# Patient Record
Sex: FEMALE | Race: WHITE | NOT HISPANIC OR LATINO | Employment: FULL TIME | URBAN - METROPOLITAN AREA
[De-identification: names, ages, dates, MRNs, and addresses within clinical notes are randomized per-mention and may not be internally consistent; named-entity substitution may affect disease eponyms.]

---

## 2023-08-10 ENCOUNTER — HOSPITAL ENCOUNTER (OUTPATIENT)
Dept: DATA CONVERSION | Facility: HOSPITAL | Age: 38
Discharge: HOME | End: 2023-08-10

## 2023-08-10 DIAGNOSIS — M75.101 UNSPECIFIED ROTATOR CUFF TEAR OR RUPTURE OF RIGHT SHOULDER, NOT SPECIFIED AS TRAUMATIC: ICD-10-CM

## 2023-08-17 LAB
10OH-CARBAZEPINE/CREAT UR CFM: NOT DETECTED NG/MG{CREAT}
6MAM UR QL CFM: NEGATIVE
6MAM/CREAT UR: NORMAL
7AMINOCLONAZEPAM/CREAT UR: NORMAL
8OH-AMOXAPINE UR QL: NOT DETECTED
8OH-LOXAPINE/CREAT UR CFM: NOT DETECTED NG/MG{CREAT}
A-OH ALPRAZ/CREAT UR: NORMAL
A-OH-TRIAZOLAM/CREAT UR CFM: NORMAL NG/MG{CREAT}
ALFENTANIL/CREAT UR CFM: NORMAL NG/MG{CREAT}
ALPHA-HYDROXYMIDAZOLAM: NORMAL
ALPRAZ/CREAT UR CFM: NORMAL NG/MG{CREAT}
AMINO CHLOROPYRIDINE: NOT DETECTED
AMITRIP UR QL CFM: NOT DETECTED
AMOBARBITAL UR QL CFM: NOT DETECTED
AMOXAPINE UR QL: NOT DETECTED
AMPHET/CREAT UR: NORMAL
AMPHETAMINES UR QL CFM: NEGATIVE
ANALGESICS NON-NARCOTIC UR QL: NORMAL
ANTICONVULSANTS UR: NEGATIVE
ANTIDEPRESSANTS UR QL: NEGATIVE
ANTIHISTAMINES UR QL CFM: NEGATIVE
ANTIPSYCHOTICS UR QL SCN: NEGATIVE
APAP UR QL: PRESENT
ARIPIPRAZOLE/CREAT UR CFM: NOT DETECTED NG/MG{CREAT}
ASENAPINE: NOT DETECTED
ATENOLOL UR QL: NOT DETECTED
ATOMOXETINE/CREAT UR CFM: NOT DETECTED NG/MG{CREAT}
BACLOFEN/CREAT UR CFM: NOT DETECTED NG/MG{CREAT}
BARBITAL UR QL CFM: NOT DETECTED
BARBITURATES UR QL CFM: NEGATIVE
BENZODIAZ UR QL CFM: NEGATIVE
BENZTROPINE UR QL: NOT DETECTED
BROMPHENIRAMINE UR QL: NOT DETECTED
BUPRENORPHINE UR QL CFM: NEGATIVE
BUPRENORPHINE/CREAT UR: NORMAL
BUPROPION UR QL: NOT DETECTED
BUTABARBITAL UR QL CFM: NOT DETECTED
BUTALBITAL UR QL CFM: NOT DETECTED
BUTORPHANOL UR QL CFM: NOT DETECTED
BZE/CREAT UR: NORMAL
CAFFEINE UR QL: NOT DETECTED
CANNABINOIDS UR QL CFM: NEGATIVE
CARBAMAZEPINE UR QL: NOT DETECTED
CARBOXYTHC/CREAT UR: NORMAL
CARISOPRODOL UR QL CFM: NOT DETECTED
CHLORPHENIR UR QL CFM: NOT DETECTED
CHLORPROMAZINE UR QL CFM: NOT DETECTED
CITALOPRAM UR QL: NOT DETECTED
CLOBAZAM UR QL: NOT DETECTED
CLOMIPRAMINE UR QL: NOT DETECTED
CLONAZEPAM/CREAT UR CFM: NORMAL NG/MG{CREAT}
CLONIDINE UR QL: NOT DETECTED
CLOZAPINE UR QL: NOT DETECTED
COCAETHYLENE/CREAT UR CFM: NORMAL NG/MG{CREAT}
COCAINE UR QL CFM: NEGATIVE
COCAINE/CREAT UR CFM: NORMAL NG/MG{CREAT}
CODEINE/CREAT UR: NORMAL
CREAT UR-MCNC: NORMAL MG/DL
CYCLOBENZAPRINE UR QL CFM: NOT DETECTED
D-METHORPHAN UR QL: NOT DETECTED
D-METHORPHAN+LEVORPHANOL UR QL: NORMAL
DESALKYLFLURAZ/CREAT UR: NORMAL NG/MG{CREAT}
DESIPRAMINE UR QL CFM: NOT DETECTED
DESMETHYLCYCLOBENZAPRINE: NOT DETECTED
DESMETHYLFLUNITRAZEPAM: NORMAL
DHC/CREAT UR: NORMAL NG/MG{CREAT}
DIAZEPAM/CREAT UR: NORMAL NG/MG{CREAT}
DICLOFENAC/CREAT UR CFM: NOT DETECTED NG/MG{CREAT}
DIETHYLPROPION UR QL CFM: NOT DETECTED
DILTIAZEM UR QL: NOT DETECTED
DIPHENHY UR QL: NOT DETECTED
DOXEPIN UR QL CFM: NOT DETECTED
DOXYLAMINE UR QL: NOT DETECTED
DRUGS UR CFM: NEGATIVE
DRUGS UR: NORMAL
DULOXETINE UR QL CFM: NOT DETECTED
EDDP/CREAT UR: NORMAL
EPHEDRIN+PSEUDO UR QL: NOT DETECTED
ETHANOL UR CFM-MCNC: NORMAL MG/DL
ETHANOL UR QL CFM: NEGATIVE
EZOGABINE/CREAT UR CFM: NOT DETECTED NG/MG{CREAT}
FENTANYL UR QL CFM: NEGATIVE
FENTANYL/CREAT UR: NORMAL
FLUNITRAZEPAM UR QL CFM: NORMAL
FLUOXETINE UR QL CFM: NOT DETECTED
FLUPHENAZINE UR QL: NOT DETECTED
FLUVOXAMINE UR QL: NOT DETECTED
GABAPENTIN UR QL: NOT DETECTED
GUAIFENESIN/CREAT UR CFM: NOT DETECTED NG/MG{CREAT}
HALLUCINOGENS UR: NEGATIVE
HALOPERIDOL UR QL CFM: NOT DETECTED
HYDROCODONE/CREAT UR: NORMAL
HYDROMORPHONE/CREAT UR: NORMAL
HYDROXYZINE UR QL: NOT DETECTED
HYPNOTICS UR QL SCN: NEGATIVE
IBUPROFEN UR QL: PRESENT
ILOPERIDONE: NOT DETECTED
IMIPRAMINE UR QL CFM: NOT DETECTED
KETAMINE UR QL CFM: NOT DETECTED
KETOPROFEN/CREAT UR CFM: NOT DETECTED NG/MG{CREAT}
LAMOTRIGINE/CREAT UR CFM: NOT DETECTED NG/MG{CREAT}
LEVEL OF DETECTION: (TOX25): NORMAL
LEVETIRACETAM/CREAT UR CFM: NOT DETECTED NG/MG{CREAT}
LIDOCAIN UR QL: NOT DETECTED
LIDOCAIN UR QL: NOT DETECTED
LOCAL ANESTHETICS: NEGATIVE
LORAZEPAM/CREAT UR: NORMAL
LOXAPINE UR QL: NOT DETECTED
LURASIDONE UR CFM-MCNC: NOT DETECTED NG/ML
M-CPP UR QL: NOT DETECTED
MAPROTILINE UR QL: NOT DETECTED
MDA/CREAT UR: NORMAL NG/MG{CREAT}
MDEA UR QL: NOT DETECTED
MDMA/CREAT UR: NORMAL NG/MG{CREAT}
ME-PHENIDATE UR QL CFM: NOT DETECTED
MEPERIDINE UR QL CFM: NOT DETECTED
MEPHOBARBITAL UR QL CFM: NOT DETECTED
MEPIVACAINE UR QL: NOT DETECTED
MEPROBAMATE UR QL CFM: NOT DETECTED
MESORIDAZINE UR QL CFM: NOT DETECTED
METAXALONE/CREAT UR CFM: NOT DETECTED NG/MG{CREAT}
METHADONE UR QL CFM: NEGATIVE
METHADONE/CREAT UR: NORMAL
METHAMPHET/CREAT UR: NORMAL
METHCATHINONE/CREAT UR CFM: NOT DETECTED NG/MG{CREAT}
METHOCARBAMOL UR QL: NOT DETECTED
METOPROLOL UR QL: NOT DETECTED
MIDAZOLAM/CREAT UR CFM: NORMAL NG/MG{CREAT}
MILNACIPRAN/CREAT UR CFM: NOT DETECTED NG/MG{CREAT}
MIRTAZAPINE UR QL CFM: NOT DETECTED
MOLINDONE/CREAT UR CFM: NOT DETECTED NG/MG{CREAT}
MORPHINE/CREAT UR: NORMAL
MUSCLE RELAXANTS: NEGATIVE
N-NORTRAMADOL/CREAT UR CFM: NORMAL NG/MG{CREAT}
NALBUPHINE UR QL CFM: NOT DETECTED
NALTREXONE UR QL CFM: NOT DETECTED
NAPROXEN/CREAT UR CFM: PRESENT NG/MG{CREAT}
NARCOTICS UR: NEGATIVE
NEFAZODONE UR QL: NOT DETECTED
NORBUPRENORPHINE/CREAT UR: NORMAL
NORCITALOPRAM UR QL: NOT DETECTED
NORCLOMIPRAMINE UR QL: NOT DETECTED
NORCLOZAPINE UR QL: NOT DETECTED
NORCODEINE/CREAT UR CFM: NORMAL NG/MG{CREAT}
NORDIAZEPAM/CREAT UR: NORMAL
NORDOXEPIN UR QL: NOT DETECTED
NORFENTANYL/CREAT UR: NORMAL
NORFLUOXETINE UR QL CFM: NOT DETECTED
NORHYDROCODONE/CREAT UR: NORMAL
NORKETAMINE UR CFM-MCNC: NOT DETECTED NG/ML
NORMEPERIDINE UR QL CFM: NOT DETECTED
NORMORPHINE UR-MCNC: NORMAL NG/ML
NOROXYCODONE/CREAT UR: NORMAL
NOROXYMORPHONE/CREAT UR CFM: NORMAL NG/MG{CREAT}
NORPROPOXYPH UR QL CFM: NOT DETECTED
NORSERTRALINE UR QL: NOT DETECTED
NORTRIP UR QL CFM: NOT DETECTED
O-NORTRAMADOL UR CFM-MCNC: NORMAL NG/ML
ODV UR QL: NOT DETECTED
OH-BUPROPION UR QL CFM: NOT DETECTED
OLANZAPINE UR QL: NOT DETECTED
OPIATES UR QL CFM: NORMAL
ORPHENADRINE UR QL: NOT DETECTED
OXAPROZIN/CREAT UR CFM: NOT DETECTED NG/MG{CREAT}
OXAZEPAM/CREAT UR: NORMAL
OXYCODONE UR QL CFM: NEGATIVE
OXYCODONE/CREAT UR: NORMAL
OXYMORPHONE/CREAT UR: NORMAL
PAROXETINE UR QL: NOT DETECTED
PCP UR QL CFM: NOT DETECTED
PENTAZOCINE UR QL CFM: NOT DETECTED
PENTOBARB UR QL CFM: NOT DETECTED
PERPHENAZINE UR QL: NOT DETECTED
PHENMETRAZINE UR QL CFM: NOT DETECTED
PHENOBARB UR QL CFM: NOT DETECTED
PHENTERMINE UR QL CFM: NOT DETECTED
PHENYTOIN UR QL: NOT DETECTED
PIMOZIDE/CREAT UR CFM: NOT DETECTED NG/MG{CREAT}
PPA UR QL: NOT DETECTED
PPAA UR QL: NOT DETECTED
PREGABALIN UR QL CFM: NOT DETECTED
PRIMIDONE/CREAT UR CFM: NOT DETECTED NG/MG{CREAT}
PROCAINE UR QL: NOT DETECTED
PROCHLORPERAZINE UR QL: NOT DETECTED
PROMETHAZINE UR QL: NOT DETECTED
PROPOXYPH UR QL CFM: NOT DETECTED
PROPRANOLOL UR QL: NOT DETECTED
PROTRIP UR QL: NOT DETECTED
PYRILAMINE UR QL: NOT DETECTED
QUETIAPINE UR QL: NOT DETECTED
RISPERIDONE UR QL: NOT DETECTED
RUFINAMIDE/CREAT UR CFM: NOT DETECTED NG/MG{CREAT}
SALICYLATES UR QL: NOT DETECTED
SECOBARBITAL UR QL CFM: NOT DETECTED
SERTRALINE UR QL: NOT DETECTED
SUFENTANIL/CREAT UR CFM: NORMAL NG/MG{CREAT}
SYMPATHOMIMETICS UR QL CFM: NEGATIVE
TAPENTADOL UR QL CFM: NEGATIVE
TAPENTADOL/CREAT UR: NORMAL
TEMAZEPAM/CREAT UR: NORMAL
THEOPHYLLINE/CREAT UR CFM: NOT DETECTED NG/MG{CREAT}
THIOPENTAL UR QL CFM: NOT DETECTED
THIORIDAZINE UR QL CFM: NOT DETECTED
THIOTHIXENE UR QL: NOT DETECTED
TIAGABINE/CREAT UR CFM: NOT DETECTED NG/MG{CREAT}
TIZANIDINE/CREAT UR CFM: NOT DETECTED NG/MG{CREAT}
TOPIRAMATE/CREAT UR CFM: NOT DETECTED NG/MG{CREAT}
TRAMADOL UR QL CFM: NORMAL
TRAZODONE UR QL: NOT DETECTED
TRIFPERAZINE UR QL: NOT DETECTED
TRIMIPRAMINE UR QL: NOT DETECTED
TRIPROLIDINE: NOT DETECTED
VENLAFAXINE UR QL: NOT DETECTED
VERAPAMIL UR QL: NOT DETECTED
VILAZ UR QL: NOT DETECTED
ZALEPLON: NORMAL
ZIPRASIDONE/CREAT UR CFM: NOT DETECTED NG/MG{CREAT}
ZOLPIDEM UR QL CFM: NOT DETECTED
ZOLPIDEM/CREAT UR: NOT DETECTED
ZONISAMIDE/CREAT UR CFM: NOT DETECTED NG/MG{CREAT}
ZOPICLONE UR QL: NOT DETECTED

## 2023-09-16 VITALS
HEIGHT: 65 IN | SYSTOLIC BLOOD PRESSURE: 118 MMHG | DIASTOLIC BLOOD PRESSURE: 70 MMHG | BODY MASS INDEX: 36.99 KG/M2 | RESPIRATION RATE: 16 BRPM | WEIGHT: 222 LBS

## 2023-09-19 PROBLEM — S39.012A STRAIN OF LUMBAR REGION: Status: ACTIVE | Noted: 2023-09-19

## 2023-09-19 PROBLEM — Z90.710 HISTORY OF HYSTERECTOMY: Status: ACTIVE | Noted: 2023-09-19

## 2023-09-19 PROBLEM — G89.29 CHRONIC LOW BACK PAIN: Status: ACTIVE | Noted: 2023-09-19

## 2023-09-19 PROBLEM — E55.9 VITAMIN D DEFICIENCY: Status: ACTIVE | Noted: 2023-09-19

## 2023-09-19 PROBLEM — R07.89 ATYPICAL CHEST PAIN: Status: ACTIVE | Noted: 2023-09-19

## 2023-09-19 PROBLEM — E66.9 OBESITY WITH BODY MASS INDEX 30 OR GREATER: Status: ACTIVE | Noted: 2023-09-19

## 2023-09-19 PROBLEM — F17.200 SMOKER: Status: ACTIVE | Noted: 2023-09-19

## 2023-09-19 PROBLEM — M75.101 ROTATOR CUFF TEAR, RIGHT: Status: ACTIVE | Noted: 2023-09-19

## 2023-09-19 PROBLEM — E78.5 HYPERLIPIDEMIA: Status: ACTIVE | Noted: 2023-09-19

## 2023-09-19 PROBLEM — R06.00 DYSPNEA: Status: ACTIVE | Noted: 2023-09-19

## 2023-09-19 PROBLEM — E89.40 POSTSURGICAL MENOPAUSE: Status: ACTIVE | Noted: 2023-09-19

## 2023-09-19 PROBLEM — G47.30 SLEEP APNEA: Status: ACTIVE | Noted: 2023-09-19

## 2023-09-19 PROBLEM — G47.00 INSOMNIA: Status: ACTIVE | Noted: 2023-09-19

## 2023-09-19 PROBLEM — S32.2XXA: Status: ACTIVE | Noted: 2023-09-19

## 2023-09-19 PROBLEM — Z86.2 HISTORY OF PERNICIOUS ANEMIA: Status: ACTIVE | Noted: 2023-09-19

## 2023-09-19 PROBLEM — M19.90 IDIOPATHIC OSTEOARTHRITIS: Status: ACTIVE | Noted: 2023-09-19

## 2023-09-19 PROBLEM — Z86.39 HISTORY OF NON ANEMIC VITAMIN B12 DEFICIENCY: Status: ACTIVE | Noted: 2023-09-19

## 2023-09-19 PROBLEM — F41.9 ANXIETY: Status: ACTIVE | Noted: 2023-09-19

## 2023-09-19 PROBLEM — R71.8 ELEVATED HEMATOCRIT: Status: ACTIVE | Noted: 2023-09-19

## 2023-09-19 PROBLEM — Z98.890 S/P ROTATOR CUFF REPAIR: Status: ACTIVE | Noted: 2023-09-19

## 2023-09-19 PROBLEM — M54.50 CHRONIC LOW BACK PAIN: Status: ACTIVE | Noted: 2023-09-19

## 2023-09-19 PROBLEM — K21.9 GASTRO-ESOPHAGEAL REFLUX DISEASE WITHOUT ESOPHAGITIS: Status: ACTIVE | Noted: 2023-09-19

## 2023-09-19 PROBLEM — R59.0 CERVICAL LYMPHADENOPATHY: Status: ACTIVE | Noted: 2023-09-19

## 2023-09-19 PROBLEM — R73.9 ELEVATED BLOOD SUGAR: Status: ACTIVE | Noted: 2023-09-19

## 2023-09-19 RX ORDER — HYDROCODONE BITARTRATE AND ACETAMINOPHEN 5; 325 MG/1; MG/1
1 TABLET ORAL EVERY 8 HOURS PRN
COMMUNITY
Start: 2023-05-17 | End: 2023-11-10 | Stop reason: SDUPTHER

## 2023-09-19 RX ORDER — ERGOCALCIFEROL 1.25 MG/1
1 CAPSULE ORAL
COMMUNITY
Start: 2016-12-02 | End: 2024-06-03 | Stop reason: ALTCHOICE

## 2023-09-19 RX ORDER — CYCLOBENZAPRINE HCL 5 MG
1 TABLET ORAL NIGHTLY PRN
COMMUNITY
Start: 2022-04-04 | End: 2024-01-08 | Stop reason: SDUPTHER

## 2023-09-19 RX ORDER — IBUPROFEN 200 MG
2 TABLET ORAL EVERY 12 HOURS PRN
COMMUNITY
End: 2024-06-03 | Stop reason: DRUGHIGH

## 2023-09-19 RX ORDER — ALBUTEROL SULFATE 90 UG/1
1-2 AEROSOL, METERED RESPIRATORY (INHALATION) EVERY 6 HOURS PRN
COMMUNITY
Start: 2016-09-23 | End: 2023-10-11 | Stop reason: WASHOUT

## 2023-09-19 RX ORDER — CYANOCOBALAMIN 1000 UG/ML
1000 INJECTION, SOLUTION INTRAMUSCULAR; SUBCUTANEOUS
COMMUNITY
Start: 2016-12-02 | End: 2023-10-11 | Stop reason: SDUPTHER

## 2023-09-19 RX ORDER — FENOFIBRATE 160 MG/1
1 TABLET ORAL DAILY
COMMUNITY
Start: 2015-12-16 | End: 2023-10-10 | Stop reason: ALTCHOICE

## 2023-09-19 RX ORDER — ASCORBIC ACID 500 MG
TABLET ORAL
COMMUNITY
Start: 2020-05-06 | End: 2024-06-03 | Stop reason: ALTCHOICE

## 2023-09-19 RX ORDER — HYDROCODONE BITARTRATE AND ACETAMINOPHEN 10; 325 MG/1; MG/1
TABLET ORAL
COMMUNITY
Start: 2015-06-30 | End: 2023-10-10 | Stop reason: ALTCHOICE

## 2023-10-10 ENCOUNTER — OFFICE VISIT (OUTPATIENT)
Dept: PAIN MEDICINE | Facility: CLINIC | Age: 38
End: 2023-10-10
Payer: COMMERCIAL

## 2023-10-10 VITALS
BODY MASS INDEX: 35.36 KG/M2 | RESPIRATION RATE: 16 BRPM | DIASTOLIC BLOOD PRESSURE: 68 MMHG | HEIGHT: 66 IN | SYSTOLIC BLOOD PRESSURE: 118 MMHG | WEIGHT: 220 LBS

## 2023-10-10 DIAGNOSIS — M75.101 ROTATOR CUFF SYNDROME OF RIGHT SHOULDER: Primary | ICD-10-CM

## 2023-10-10 PROCEDURE — 99214 OFFICE O/P EST MOD 30 MIN: CPT | Performed by: ANESTHESIOLOGY

## 2023-10-10 ASSESSMENT — PAIN SCALES - GENERAL
PAINLEVEL: 7
PAINLEVEL_OUTOF10: 7

## 2023-10-10 ASSESSMENT — ENCOUNTER SYMPTOMS
RESPIRATORY NEGATIVE: 1
ARTHRALGIAS: 1
CONSTITUTIONAL NEGATIVE: 1
GASTROINTESTINAL NEGATIVE: 1
NEUROLOGICAL NEGATIVE: 1
EYES NEGATIVE: 1
MYALGIAS: 1
CARDIOVASCULAR NEGATIVE: 1
PSYCHIATRIC NEGATIVE: 1
ENDOCRINE NEGATIVE: 1
ALLERGIC/IMMUNOLOGIC NEGATIVE: 1
HEMATOLOGIC/LYMPHATIC NEGATIVE: 1

## 2023-10-10 ASSESSMENT — PATIENT HEALTH QUESTIONNAIRE - PHQ9
1. LITTLE INTEREST OR PLEASURE IN DOING THINGS: NOT AT ALL
2. FEELING DOWN, DEPRESSED OR HOPELESS: NOT AT ALL
SUM OF ALL RESPONSES TO PHQ9 QUESTIONS 1 AND 2: 0

## 2023-10-10 ASSESSMENT — PAIN - FUNCTIONAL ASSESSMENT: PAIN_FUNCTIONAL_ASSESSMENT: 0-10

## 2023-10-10 ASSESSMENT — LIFESTYLE VARIABLES: TOTAL SCORE: 1

## 2023-10-10 NOTE — PROGRESS NOTES
MEDICATION NAME: norco  STRENGTH: 5/325mg  LAST FILL DATE: 8/10/23  DATE LAST TAKEN: 10/10/23  QUANTITY FILLED: 90  QUANTITY REMAIN  COUNT COMPLETED BY: JHON FERNANDEZ RN and TREY GUIDO MD        CONTROLLED SUBSTANCES AGREEMENT LAST SIGNED: 2023  ORT LAST COMPLETED:10/10/2023  Modified [employer] disability form filled out annually.

## 2023-10-10 NOTE — PROGRESS NOTES
Subjective   Patient ID: Joi Dickerson is a 38 y.o. female who presents for right shoulder pain.  HPI patient here today for management of her chronic right shoulder pain secondary to rotator cuff injury.  She has had surgery on her shoulder in the past.  She underwent an independent medical exam by the state examiner in order to get an approval to have consultation with Dr. Quirino Marroquin about her chronic right shoulder pain.  She has pain that starts in the shoulder and will radiate into the trapezius and when severe into her neck.  She wants to states that the pain starts in the shoulder does not start in her neck.  She reports no numbness, tingling, weakness in the upper extremity.  She does have some decreased range of motion and reaching above her head or above the level of her shoulder will cause pain.  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  She does use her medications very sparingly manner.  She does not require refill of her medications.  Reports no adverse side effects to the current medication regimen.  When the pain levels are elevated she will use these medications for flares of pain.    Review of Systems   Constitutional: Negative.    HENT: Negative.     Eyes: Negative.    Respiratory: Negative.     Cardiovascular: Negative.    Gastrointestinal: Negative.    Endocrine: Negative.    Genitourinary: Negative.    Musculoskeletal:  Positive for arthralgias and myalgias.   Skin: Negative.    Allergic/Immunologic: Negative.    Neurological: Negative.    Hematological: Negative.    Psychiatric/Behavioral: Negative.       Objective   Physical Exam  Vitals and nursing note reviewed.   Constitutional:       Appearance: Normal appearance.   HENT:      Head: Normocephalic and atraumatic.      Right Ear: Ear canal and external ear normal.      Left Ear: Ear canal and external ear normal.      Nose: Nose normal.      Mouth/Throat:      Mouth: Mucous  membranes are moist.      Pharynx: Oropharynx is clear.   Eyes:      Conjunctiva/sclera: Conjunctivae normal.      Pupils: Pupils are equal, round, and reactive to light.   Cardiovascular:      Rate and Rhythm: Normal rate.   Pulmonary:      Effort: Pulmonary effort is normal. No respiratory distress.   Musculoskeletal:      Right shoulder: Tenderness present. Decreased range of motion. Decreased strength.        Arms:       Cervical back: Normal range of motion and neck supple.      Lumbar back: Tenderness present. Normal range of motion.      Comments: Lift Off Test (+) Right      Skin:     General: Skin is warm and dry.   Neurological:      Mental Status: She is alert.   Psychiatric:         Mood and Affect: Mood normal.         Thought Content: Thought content normal.     Assessment/Plan   Problem List Items Addressed This Visit    None  Visit Diagnoses         Codes    Rotator cuff syndrome of right shoulder    -  Primary M75.101        I nice discussion with the patient today our plan will be as follows.    Radiology: no new imaging to review    Physically:  continue home exercises daily     Psychologically:  nothing at this time.     Medication: No refill needed today.  Pill count-42  All previous urine drug screens and saliva drug screens were reviewed today and are compliant with the patient's prescriptive history.      Duration:  > 2 years    Intervention:  Patient is excellent candidate for right suprascapular PNS implant.  However, at this time [facility] will not allow her access to this therapy.

## 2023-10-11 ENCOUNTER — OFFICE VISIT (OUTPATIENT)
Dept: PRIMARY CARE | Facility: CLINIC | Age: 38
End: 2023-10-11
Payer: COMMERCIAL

## 2023-10-11 VITALS
BODY MASS INDEX: 36.82 KG/M2 | HEART RATE: 89 BPM | WEIGHT: 221 LBS | DIASTOLIC BLOOD PRESSURE: 62 MMHG | TEMPERATURE: 97.2 F | SYSTOLIC BLOOD PRESSURE: 124 MMHG | OXYGEN SATURATION: 98 % | HEIGHT: 65 IN

## 2023-10-11 DIAGNOSIS — R92.8 ABNORMAL MAMMOGRAM OF RIGHT BREAST: ICD-10-CM

## 2023-10-11 DIAGNOSIS — R53.83 FATIGUE, UNSPECIFIED TYPE: ICD-10-CM

## 2023-10-11 DIAGNOSIS — Z00.00 ANNUAL PHYSICAL EXAM: Primary | ICD-10-CM

## 2023-10-11 DIAGNOSIS — D51.0 PERNICIOUS ANEMIA: ICD-10-CM

## 2023-10-11 DIAGNOSIS — E03.9 HYPOTHYROIDISM, UNSPECIFIED TYPE: ICD-10-CM

## 2023-10-11 DIAGNOSIS — Z01.89 ENCOUNTER FOR ROUTINE LABORATORY TESTING: ICD-10-CM

## 2023-10-11 DIAGNOSIS — E55.9 VITAMIN D DEFICIENCY: ICD-10-CM

## 2023-10-11 DIAGNOSIS — E78.2 MIXED HYPERLIPIDEMIA: ICD-10-CM

## 2023-10-11 DIAGNOSIS — R73.9 HYPERGLYCEMIA: ICD-10-CM

## 2023-10-11 DIAGNOSIS — E78.00 HYPERCHOLESTEROLEMIA: ICD-10-CM

## 2023-10-11 PROCEDURE — 99386 PREV VISIT NEW AGE 40-64: CPT | Performed by: NURSE PRACTITIONER

## 2023-10-11 PROCEDURE — 4004F PT TOBACCO SCREEN RCVD TLK: CPT | Performed by: NURSE PRACTITIONER

## 2023-10-11 RX ORDER — CYANOCOBALAMIN 1000 UG/ML
1000 INJECTION, SOLUTION INTRAMUSCULAR; SUBCUTANEOUS
Qty: 3 ML | Refills: 2 | Status: SHIPPED | OUTPATIENT
Start: 2023-10-11 | End: 2023-12-13 | Stop reason: SDUPTHER

## 2023-10-11 ASSESSMENT — ENCOUNTER SYMPTOMS
PSYCHIATRIC NEGATIVE: 1
ENDOCRINE NEGATIVE: 1
GASTROINTESTINAL NEGATIVE: 1
DEPRESSION: 0
LOSS OF SENSATION IN FEET: 0
RESPIRATORY NEGATIVE: 1
HEMATOLOGIC/LYMPHATIC NEGATIVE: 1
ALLERGIC/IMMUNOLOGIC NEGATIVE: 1
OCCASIONAL FEELINGS OF UNSTEADINESS: 0
CONSTITUTIONAL NEGATIVE: 1
MUSCULOSKELETAL NEGATIVE: 1
EYES NEGATIVE: 1
NEUROLOGICAL NEGATIVE: 1
CARDIOVASCULAR NEGATIVE: 1

## 2023-10-11 ASSESSMENT — PAIN SCALES - GENERAL: PAINLEVEL: 4

## 2023-10-11 NOTE — PROGRESS NOTES
[facility]: [facility]  PROGRESS NOTE      Joi Dickerson is a 38 y.o. female that is presenting today to establish PCP and CPE. She is followed by Pain Management, Dr. Aguero, for rotator cuff syndrome of the right shoulder. She has a h/o pernicious anemia managed with B12 injections that she hopes to establish here to receive.    Assessment/Plan   Diagnoses and all orders for this visit:  Annual physical exam  Abnormal mammogram of right breast  -     BI mammo right diagnostic; Future  Pernicious anemia  -     Vitamin B12; Future  Hypercholesterolemia  Fatigue, unspecified type  Encounter for routine laboratory testing  -     Basic Metabolic Panel; Future  -     CBC and Auto Differential; Future  -     Hemoglobin A1C; Future  -     Hepatic Function Panel; Future  -     Lipid Panel; Future  -     Vitamin D 25-Hydroxy,Total (for eval of Vitamin D levels); Future  Hyperglycemia  -     Hemoglobin A1C; Future  Mixed hyperlipidemia  -     Lipid Panel; Future  Vitamin D deficiency  -     Vitamin D 25-Hydroxy,Total (for eval of Vitamin D levels); Future  Hypothyroidism, unspecified type  -     Thyroid Stimulating Hormone; Future  Subjective   HPI  Review of Systems   Constitutional: Negative.    HENT: Negative.     Eyes: Negative.    Respiratory: Negative.     Cardiovascular: Negative.    Gastrointestinal: Negative.    Endocrine: Negative.    Genitourinary: Negative.    Musculoskeletal: Negative.    Skin: Negative.    Allergic/Immunologic: Negative.    Neurological: Negative.    Hematological: Negative.    Psychiatric/Behavioral: Negative.     All other systems reviewed and are negative.     Objective   Vitals:    10/11/23 1357   BP: 124/62   Pulse: 89   Temp: 36.2 °C (97.2 °F)   SpO2: 98%      Body mass index is 36.78 kg/m².  Physical Exam  Vitals and nursing note reviewed.   Constitutional:       General: She is not in acute distress.     Appearance: Normal appearance. She is not ill-appearing.    HENT:      Head: Normocephalic and atraumatic.      Right Ear: Tympanic membrane, ear canal and external ear normal. There is no impacted cerumen.      Left Ear: Tympanic membrane, ear canal and external ear normal. There is no impacted cerumen.      Nose: Nose normal.      Mouth/Throat:      Mouth: Mucous membranes are moist.      Pharynx: Oropharynx is clear. No oropharyngeal exudate or posterior oropharyngeal erythema.   Eyes:      General: No scleral icterus.        Right eye: No discharge.         Left eye: No discharge.      Extraocular Movements: Extraocular movements intact.      Conjunctiva/sclera: Conjunctivae normal.      Pupils: Pupils are equal, round, and reactive to light.   Neck:      Vascular: No carotid bruit.   Cardiovascular:      Rate and Rhythm: Normal rate and regular rhythm.      Pulses: Normal pulses.      Heart sounds: Normal heart sounds. No murmur heard.     No friction rub. No gallop.   Pulmonary:      Effort: Pulmonary effort is normal. No respiratory distress.      Breath sounds: Normal breath sounds.   Abdominal:      General: Abdomen is flat. Bowel sounds are normal. There is no distension.      Palpations: Abdomen is soft.      Tenderness: There is no abdominal tenderness.      Hernia: No hernia is present.   Musculoskeletal:         General: No swelling or tenderness. Normal range of motion.   Lymphadenopathy:      Cervical: No cervical adenopathy.   Skin:     General: Skin is warm and dry.      Capillary Refill: Capillary refill takes less than 2 seconds.      Coloration: Skin is not jaundiced.      Findings: No rash.   Neurological:      General: No focal deficit present.      Mental Status: She is alert and oriented to person, place, and time. Mental status is at baseline.   Psychiatric:         Mood and Affect: Mood normal.         Behavior: Behavior normal.       Diagnostic Results   Lab Results   Component Value Date    GLUCOSE 94 03/21/2023    CALCIUM 9.5 03/21/2023    NA  "136 03/21/2023    K 4.3 03/21/2023    CO2 24 03/21/2023     03/21/2023    BUN 9 03/21/2023    CREATININE 0.7 03/21/2023     Lab Results   Component Value Date    ALT 17 05/13/2022    AST 18 05/13/2022    ALKPHOS 69 05/13/2022    BILITOT 0.3 05/13/2022     Lab Results   Component Value Date    WBC 11.1 (H) 03/21/2023    HGB 14.8 03/21/2023    HCT 45.2 (H) 03/21/2023    MCV 91.3 03/21/2023     03/21/2023     Lab Results   Component Value Date    CHOL 216 (H) 05/13/2022    CHOL 226 (H) 02/19/2021     Lab Results   Component Value Date    HDL 41 (L) 05/13/2022    HDL 41 (L) 02/19/2021     Lab Results   Component Value Date    LDLCALC 132 (H) 05/13/2022    LDLCALC 120 02/19/2021     Lab Results   Component Value Date    TRIG 216 (H) 05/13/2022    TRIG 324 (H) 02/19/2021     No components found for: \"CHOLHDL\"  No results found for: \"HGBA1C\"  Other labs not included in the list above were reviewed either before or during this encounter.    History    Past Medical History:   Diagnosis Date    Anemia     Arthritis     Headache     Unspecified injury of shoulder and upper arm, unspecified arm, initial encounter 07/29/2015    Shoulder injury     Past Surgical History:   Procedure Laterality Date    APPENDECTOMY  2007    CHOLECYSTECTOMY  11/28/2016    Cholecystectomy    HYSTERECTOMY  07/29/2015    Hysterectomy    SHOULDER SURGERY  07/29/2015    Shoulder Surgery    UMBILICAL HERNIA REPAIR  07/29/2015    Umbilical Hernia Repair     Family History   Problem Relation Name Age of Onset    Other (htn) Mother Eleanor     Other (morbid obesity) Mother Eleanor     Hypertension Mother Eleanor     Cancer Father Father     Other (morbid obesity [Other]) Sister      Other (morbid obesity [Other]) Brother      Other (morbid obesity [Other]) Son      Other (htn) Son      Ovarian cancer Other family history     Thyroid disease Other family history     Other (htn) Other family history      Social History     Socioeconomic History    Marital " status:      Spouse name: Not on file    Number of children: Not on file    Years of education: Not on file    Highest education level: Not on file   Occupational History    Not on file   Tobacco Use    Smoking status: Every Day     Packs/day: 0.50     Years: 15.00     Additional pack years: 0.00     Total pack years: 7.50     Types: Cigarettes    Smokeless tobacco: Never   Substance and Sexual Activity    Alcohol use: Never    Drug use: Never    Sexual activity: Yes     Partners: Choose not to disclose     Birth control/protection: Post-menopausal   Other Topics Concern    Not on file   Social History Narrative    Not on file     Social Determinants of Health     Financial Resource Strain: Not on file   Food Insecurity: Not on file   Transportation Needs: Not on file   Physical Activity: Not on file   Stress: Not on file   Social Connections: Not on file   Intimate Partner Violence: Not on file   Housing Stability: Not on file     Allergies   Allergen Reactions    Oxycodone-Acetaminophen Other     Severe N/V    Prochlorperazine Other     Tachycardia      Dihydroergotamine Hallucinations     Tachycardia      Droperidol Other and Hallucinations     tachycardia    Prasterone (Dhea) Hallucinations    Metoclopramide Hcl Anxiety     tachycardia     Current Outpatient Medications on File Prior to Visit   Medication Sig Dispense Refill    ascorbic acid (Vitamin C) 500 mg tablet As directed oral      cyanocobalamin (Vitamin B-12) 1,000 mcg/mL injection Inject 1 mL (1,000 mcg) into the shoulder, thigh, or buttocks every 30 (thirty) days.      cyclobenzaprine (Flexeril) 5 mg tablet Take 1 tablet (5 mg) by mouth as needed at bedtime.      HYDROcodone-acetaminophen (Norco) 5-325 mg tablet Take 1 tablet by mouth every 8 hours if needed.      ibuprofen 200 mg tablet Take 2 tablets (400 mg) by mouth every 12 hours if needed. With food or milk      MAGNESIUM ORAL Take 2 tablets by mouth once daily. With meal      syringe  "with needle 1 mL 25 gauge x 5/8\" syringe as directed use to inject vit B12 IM every 4 weeks      ergocalciferol (Vitamin D-2) 1.25 MG (78727 UT) capsule Take 1 capsule (1,250 mcg) by mouth 1 (one) time per week.      [DISCONTINUED] albuterol (ProAir HFA) 90 mcg/actuation inhaler Inhale 1-2 puffs every 6 hours if needed.      [DISCONTINUED] fenofibrate (Triglide) 160 mg tablet Take 1 tablet (160 mg) by mouth once daily.      [DISCONTINUED] HYDROcodone-acetaminophen (Norco)  mg tablet Take by mouth.       No current facility-administered medications on file prior to visit.     Immunization History   Administered Date(s) Administered    Hepatitis B vaccine, pediatric/adolescent (RECOMBIVAX, ENGERIX) 06/14/2000    Tdap vaccine, age 7 year and older (BOOSTRIX) 05/03/2022     Patient's medical history was reviewed and updated either before or during this encounter.    Leighann Pitts, APRN-CNP  "

## 2023-11-10 DIAGNOSIS — S46.011A TRAUMATIC INCOMPLETE TEAR OF RIGHT ROTATOR CUFF, INITIAL ENCOUNTER: Primary | ICD-10-CM

## 2023-11-10 RX ORDER — HYDROCODONE BITARTRATE AND ACETAMINOPHEN 5; 325 MG/1; MG/1
1 TABLET ORAL EVERY 8 HOURS PRN
Qty: 90 TABLET | Refills: 0 | Status: SHIPPED | OUTPATIENT
Start: 2023-11-10 | End: 2023-12-10

## 2023-11-15 ENCOUNTER — APPOINTMENT (OUTPATIENT)
Dept: PRIMARY CARE | Facility: CLINIC | Age: 38
End: 2023-11-15
Payer: COMMERCIAL

## 2023-12-09 ENCOUNTER — LAB (OUTPATIENT)
Dept: LAB | Facility: LAB | Age: 38
End: 2023-12-09
Payer: COMMERCIAL

## 2023-12-09 DIAGNOSIS — E03.9 HYPOTHYROIDISM, UNSPECIFIED TYPE: ICD-10-CM

## 2023-12-09 DIAGNOSIS — D51.0 PERNICIOUS ANEMIA: ICD-10-CM

## 2023-12-09 DIAGNOSIS — R73.9 HYPERGLYCEMIA: ICD-10-CM

## 2023-12-09 DIAGNOSIS — Z01.89 ENCOUNTER FOR ROUTINE LABORATORY TESTING: ICD-10-CM

## 2023-12-09 DIAGNOSIS — E78.2 MIXED HYPERLIPIDEMIA: ICD-10-CM

## 2023-12-09 DIAGNOSIS — E55.9 VITAMIN D DEFICIENCY: ICD-10-CM

## 2023-12-09 LAB
25(OH)D3 SERPL-MCNC: 24 NG/ML (ref 30–100)
ALBUMIN SERPL BCP-MCNC: 4.2 G/DL (ref 3.4–5)
ALP SERPL-CCNC: 61 U/L (ref 33–110)
ALT SERPL W P-5'-P-CCNC: 15 U/L (ref 7–45)
ANION GAP SERPL CALC-SCNC: 13 MMOL/L (ref 10–20)
AST SERPL W P-5'-P-CCNC: 15 U/L (ref 9–39)
BASOPHILS # BLD AUTO: 0.12 X10*3/UL (ref 0–0.1)
BASOPHILS NFR BLD AUTO: 1.3 %
BILIRUB DIRECT SERPL-MCNC: 0 MG/DL (ref 0–0.3)
BILIRUB SERPL-MCNC: 0.4 MG/DL (ref 0–1.2)
BUN SERPL-MCNC: 10 MG/DL (ref 6–23)
CALCIUM SERPL-MCNC: 9.4 MG/DL (ref 8.6–10.3)
CHLORIDE SERPL-SCNC: 105 MMOL/L (ref 98–107)
CHOLEST SERPL-MCNC: 188 MG/DL (ref 0–199)
CHOLESTEROL/HDL RATIO: 4.4
CO2 SERPL-SCNC: 24 MMOL/L (ref 21–32)
CREAT SERPL-MCNC: 0.61 MG/DL (ref 0.5–1.05)
EOSINOPHIL # BLD AUTO: 0.55 X10*3/UL (ref 0–0.7)
EOSINOPHIL NFR BLD AUTO: 6 %
ERYTHROCYTE [DISTWIDTH] IN BLOOD BY AUTOMATED COUNT: 13.2 % (ref 11.5–14.5)
EST. AVERAGE GLUCOSE BLD GHB EST-MCNC: 97 MG/DL
GFR SERPL CREATININE-BSD FRML MDRD: >90 ML/MIN/1.73M*2
GLUCOSE SERPL-MCNC: 72 MG/DL (ref 74–99)
HBA1C MFR BLD: 5 %
HCT VFR BLD AUTO: 48.9 % (ref 36–46)
HDLC SERPL-MCNC: 42.7 MG/DL
HGB BLD-MCNC: 15 G/DL (ref 12–16)
IMM GRANULOCYTES # BLD AUTO: 0.02 X10*3/UL (ref 0–0.7)
IMM GRANULOCYTES NFR BLD AUTO: 0.2 % (ref 0–0.9)
LDLC SERPL CALC-MCNC: 99 MG/DL
LYMPHOCYTES # BLD AUTO: 4.29 X10*3/UL (ref 1.2–4.8)
LYMPHOCYTES NFR BLD AUTO: 46.9 %
MCH RBC QN AUTO: 30.1 PG (ref 26–34)
MCHC RBC AUTO-ENTMCNC: 30.7 G/DL (ref 32–36)
MCV RBC AUTO: 98 FL (ref 80–100)
MONOCYTES # BLD AUTO: 0.45 X10*3/UL (ref 0.1–1)
MONOCYTES NFR BLD AUTO: 4.9 %
NEUTROPHILS # BLD AUTO: 3.72 X10*3/UL (ref 1.2–7.7)
NEUTROPHILS NFR BLD AUTO: 40.7 %
NON HDL CHOLESTEROL: 145 MG/DL (ref 0–149)
NRBC BLD-RTO: 0 /100 WBCS (ref 0–0)
PLATELET # BLD AUTO: 279 X10*3/UL (ref 150–450)
POTASSIUM SERPL-SCNC: 4.3 MMOL/L (ref 3.5–5.3)
PROT SERPL-MCNC: 7.2 G/DL (ref 6.4–8.2)
RBC # BLD AUTO: 4.99 X10*6/UL (ref 4–5.2)
SODIUM SERPL-SCNC: 138 MMOL/L (ref 136–145)
TRIGL SERPL-MCNC: 234 MG/DL (ref 0–149)
TSH SERPL-ACNC: 1.49 MIU/L (ref 0.44–3.98)
VIT B12 SERPL-MCNC: 298 PG/ML (ref 211–911)
VLDL: 47 MG/DL (ref 0–40)
WBC # BLD AUTO: 9.2 X10*3/UL (ref 4.4–11.3)

## 2023-12-09 PROCEDURE — 80061 LIPID PANEL: CPT

## 2023-12-09 PROCEDURE — 36415 COLL VENOUS BLD VENIPUNCTURE: CPT

## 2023-12-09 PROCEDURE — 82248 BILIRUBIN DIRECT: CPT

## 2023-12-09 PROCEDURE — 85025 COMPLETE CBC W/AUTO DIFF WBC: CPT

## 2023-12-09 PROCEDURE — 83036 HEMOGLOBIN GLYCOSYLATED A1C: CPT

## 2023-12-09 PROCEDURE — 82306 VITAMIN D 25 HYDROXY: CPT

## 2023-12-09 PROCEDURE — 84443 ASSAY THYROID STIM HORMONE: CPT

## 2023-12-09 PROCEDURE — 82607 VITAMIN B-12: CPT

## 2023-12-09 PROCEDURE — 80053 COMPREHEN METABOLIC PANEL: CPT

## 2023-12-12 ENCOUNTER — APPOINTMENT (OUTPATIENT)
Dept: PAIN MEDICINE | Facility: CLINIC | Age: 38
End: 2023-12-12
Payer: COMMERCIAL

## 2023-12-13 ENCOUNTER — APPOINTMENT (OUTPATIENT)
Dept: PRIMARY CARE | Facility: CLINIC | Age: 38
End: 2023-12-13
Payer: COMMERCIAL

## 2023-12-13 DIAGNOSIS — D51.0 PERNICIOUS ANEMIA: ICD-10-CM

## 2023-12-13 RX ORDER — CYANOCOBALAMIN 1000 UG/ML
1000 INJECTION, SOLUTION INTRAMUSCULAR; SUBCUTANEOUS
Qty: 3 ML | Refills: 1 | Status: SHIPPED | OUTPATIENT
Start: 2023-12-13 | End: 2024-06-03 | Stop reason: SDUPTHER

## 2023-12-13 NOTE — TELEPHONE ENCOUNTER
LV 10/11/23, NV 10/23/24.  Request for cyanocobalamin 1000 mcg/ml to [facility] [address] [place] [address]

## 2023-12-19 ENCOUNTER — HOSPITAL ENCOUNTER (OUTPATIENT)
Dept: RADIOLOGY | Facility: HOSPITAL | Age: 38
Discharge: HOME | End: 2023-12-19
Payer: COMMERCIAL

## 2023-12-19 ENCOUNTER — APPOINTMENT (OUTPATIENT)
Dept: PAIN MEDICINE | Facility: CLINIC | Age: 38
End: 2023-12-19
Payer: COMMERCIAL

## 2023-12-19 VITALS — BODY MASS INDEX: 33.75 KG/M2 | WEIGHT: 210 LBS | HEIGHT: 66 IN

## 2023-12-19 DIAGNOSIS — Z12.31 ENCOUNTER FOR SCREENING MAMMOGRAM FOR BREAST CANCER: Primary | ICD-10-CM

## 2023-12-19 DIAGNOSIS — R92.8 ABNORMAL MAMMOGRAM OF RIGHT BREAST: ICD-10-CM

## 2023-12-19 DIAGNOSIS — R92.8 OTHER ABNORMAL AND INCONCLUSIVE FINDINGS ON DIAGNOSTIC IMAGING OF BREAST: ICD-10-CM

## 2023-12-19 PROCEDURE — 77065 DX MAMMO INCL CAD UNI: CPT | Mod: RT

## 2024-01-08 ENCOUNTER — OFFICE VISIT (OUTPATIENT)
Dept: PAIN MEDICINE | Facility: CLINIC | Age: 39
End: 2024-01-08
Payer: COMMERCIAL

## 2024-01-08 VITALS
WEIGHT: 208 LBS | HEIGHT: 66 IN | RESPIRATION RATE: 16 BRPM | BODY MASS INDEX: 33.43 KG/M2 | DIASTOLIC BLOOD PRESSURE: 72 MMHG | SYSTOLIC BLOOD PRESSURE: 112 MMHG

## 2024-01-08 DIAGNOSIS — S46.011A TRAUMATIC INCOMPLETE TEAR OF RIGHT ROTATOR CUFF, INITIAL ENCOUNTER: Primary | ICD-10-CM

## 2024-01-08 PROCEDURE — 99213 OFFICE O/P EST LOW 20 MIN: CPT | Performed by: ANESTHESIOLOGY

## 2024-01-08 PROCEDURE — G0463 HOSPITAL OUTPT CLINIC VISIT: HCPCS | Performed by: ANESTHESIOLOGY

## 2024-01-08 RX ORDER — HYDROCODONE BITARTRATE AND ACETAMINOPHEN 5; 325 MG/1; MG/1
1 TABLET ORAL EVERY 8 HOURS PRN
COMMUNITY
End: 2024-03-22 | Stop reason: SDUPTHER

## 2024-01-08 RX ORDER — CYCLOBENZAPRINE HCL 5 MG
5 TABLET ORAL NIGHTLY PRN
Qty: 30 TABLET | Refills: 2 | Status: SHIPPED | OUTPATIENT
Start: 2024-01-08 | End: 2024-01-08 | Stop reason: ENTERED-IN-ERROR

## 2024-01-08 ASSESSMENT — ENCOUNTER SYMPTOMS
MYALGIAS: 1
GASTROINTESTINAL NEGATIVE: 1
PSYCHIATRIC NEGATIVE: 1
CARDIOVASCULAR NEGATIVE: 1
ENDOCRINE NEGATIVE: 1
CONSTITUTIONAL NEGATIVE: 1
ARTHRALGIAS: 1
NEUROLOGICAL NEGATIVE: 1
ALLERGIC/IMMUNOLOGIC NEGATIVE: 1
EYES NEGATIVE: 1
HEMATOLOGIC/LYMPHATIC NEGATIVE: 1
RESPIRATORY NEGATIVE: 1

## 2024-01-08 ASSESSMENT — PAIN DESCRIPTION - DESCRIPTORS: DESCRIPTORS: ACHING;TINGLING;NUMBNESS

## 2024-01-08 ASSESSMENT — PAIN SCALES - GENERAL: PAINLEVEL_OUTOF10: 6

## 2024-01-08 ASSESSMENT — PATIENT HEALTH QUESTIONNAIRE - PHQ9
1. LITTLE INTEREST OR PLEASURE IN DOING THINGS: NOT AT ALL
SUM OF ALL RESPONSES TO PHQ9 QUESTIONS 1 AND 2: 0
2. FEELING DOWN, DEPRESSED OR HOPELESS: NOT AT ALL

## 2024-01-08 ASSESSMENT — LIFESTYLE VARIABLES: TOTAL SCORE: 1

## 2024-01-08 ASSESSMENT — PAIN - FUNCTIONAL ASSESSMENT: PAIN_FUNCTIONAL_ASSESSMENT: 0-10

## 2024-01-08 NOTE — PROGRESS NOTES
MEDICATION NAME: norco  STRENGTH: 5/325mg  LAST FILL DATE: 11/10/23  DATE LAST TAKEN: 2024  QUANTITY FILLED: 90  QUANTITY REMAININ  COUNT COMPLETED BY: JHON FERNANDEZ RN and TREY GUIDO MD      CONTROLLED SUBSTANCES AGREEMENT LAST SIGNED: 2023  ORT LAST COMPLETED:2024  Modified [employer] disability form filled out annually.

## 2024-01-08 NOTE — PROGRESS NOTES
Subjective   Patient ID: Joi Dickerson is a 38 y.o. female who presents for right shoulder pain.  HPI  Patient here today for follow up today and management of her right shoulder pain.  She states that her should pain has been horrible.  She has a hearing on the 18th about her shoulder.  She is frustrated that workers comp is denying her all her treatments.  She had a consult with Dr. Walter > 1 year ago and states there is a small RC tear but he did not want to pursue a surgery at that time.  She has intermittent stabbing pain in her lateral aspect of her shoulder and anterior shoulder.  It will radiate and then let up.  She asked Dr. Walter' for suggestions and did not offer anything.  She is rating her pain as a 6/10 today.  She does need a refill of her flexeril today.      Review of Systems   Constitutional: Negative.    HENT: Negative.     Eyes: Negative.    Respiratory: Negative.     Cardiovascular: Negative.    Gastrointestinal: Negative.    Endocrine: Negative.    Genitourinary: Negative.    Musculoskeletal:  Positive for arthralgias and myalgias.   Skin: Negative.    Allergic/Immunologic: Negative.    Neurological: Negative.    Hematological: Negative.    Psychiatric/Behavioral: Negative.         Objective   Physical Exam  Vitals and nursing note reviewed.   Constitutional:       Appearance: Normal appearance.   HENT:      Head: Normocephalic and atraumatic.      Right Ear: Ear canal and external ear normal.      Left Ear: Ear canal and external ear normal.      Nose: Nose normal.      Mouth/Throat:      Mouth: Mucous membranes are moist.      Pharynx: Oropharynx is clear.   Eyes:      Conjunctiva/sclera: Conjunctivae normal.      Pupils: Pupils are equal, round, and reactive to light.   Cardiovascular:      Rate and Rhythm: Normal rate.   Pulmonary:      Effort: Pulmonary effort is normal. No respiratory distress.   Musculoskeletal:      Right shoulder: Tenderness present. Decreased range of motion.  Decreased strength (4/5).        Arms:       Cervical back: Normal range of motion and neck supple.      Lumbar back: Tenderness present. Normal range of motion.      Comments: Lift Off Test (+) Right   Pain with adduction and external rotation     Skin:     General: Skin is warm and dry.   Neurological:      Mental Status: She is alert.   Psychiatric:         Mood and Affect: Mood normal.         Thought Content: Thought content normal.         Assessment/Plan   Problem List Items Addressed This Visit             ICD-10-CM       Musculoskeletal and Injuries    Rotator cuff tear, right - Primary M75.101        I nice discussion with the patient today our plan will be as follows.    Radiology: All available imaging was reviewed today.    Physically: Patient should continue home exercise program.  I do think she would benefit from working with PT, Sports med, and Massotherapy at this time.  Unfortunately, [facility] is not providing and basic care for her.  Patient may want to seek care outside of her claim giving her pain is progressing.      Psychologically: No issues at this time.    Medication:  Patient does not need any refill and this time.  We will stop the flexeril as it does not help her pain but causes sedation.   Patient continues to take her Norco on a very limited basis at this time as she does have to continue to care for her family.    Per our discussion about her experience in the hearing that the opposing legal did suggest she take her opioids more regularly and frequently and this is horrible and not in keeping with the mission of  or medicine in general.  The goal is to always move to patients taking no opioids on a chronic basis.  I do feel at this point [facility] is offering her a substandard level of care.        Duration: Greater than 1 year.    Intervention:  No intervention at this time.  Patient is a excellent candidate for PNS but [facility] continues to deny her any treatment for her condition.        Alber  ELIZABETH Aguero MD 01/08/24 3:06 PM

## 2024-02-13 ENCOUNTER — TELEPHONE (OUTPATIENT)
Dept: PAIN MEDICINE | Facility: CLINIC | Age: 39
End: 2024-02-13

## 2024-02-13 ENCOUNTER — OFFICE VISIT (OUTPATIENT)
Dept: PAIN MEDICINE | Facility: CLINIC | Age: 39
End: 2024-02-13
Payer: COMMERCIAL

## 2024-02-13 VITALS
WEIGHT: 208 LBS | DIASTOLIC BLOOD PRESSURE: 76 MMHG | HEART RATE: 95 BPM | SYSTOLIC BLOOD PRESSURE: 120 MMHG | HEIGHT: 66 IN | BODY MASS INDEX: 33.43 KG/M2 | RESPIRATION RATE: 16 BRPM

## 2024-02-13 DIAGNOSIS — M75.101 TEAR OF RIGHT ROTATOR CUFF, UNSPECIFIED TEAR EXTENT, UNSPECIFIED WHETHER TRAUMATIC: Primary | ICD-10-CM

## 2024-02-13 PROCEDURE — 99213 OFFICE O/P EST LOW 20 MIN: CPT | Performed by: ANESTHESIOLOGY

## 2024-02-13 ASSESSMENT — ENCOUNTER SYMPTOMS
EYES NEGATIVE: 1
HEMATOLOGIC/LYMPHATIC NEGATIVE: 1
MYALGIAS: 1
GASTROINTESTINAL NEGATIVE: 1
ALLERGIC/IMMUNOLOGIC NEGATIVE: 1
PSYCHIATRIC NEGATIVE: 1
CARDIOVASCULAR NEGATIVE: 1
ARTHRALGIAS: 1
ENDOCRINE NEGATIVE: 1
RESPIRATORY NEGATIVE: 1
NEUROLOGICAL NEGATIVE: 1
CONSTITUTIONAL NEGATIVE: 1

## 2024-02-13 ASSESSMENT — PATIENT HEALTH QUESTIONNAIRE - PHQ9
2. FEELING DOWN, DEPRESSED OR HOPELESS: NOT AT ALL
SUM OF ALL RESPONSES TO PHQ9 QUESTIONS 1 AND 2: 0
1. LITTLE INTEREST OR PLEASURE IN DOING THINGS: NOT AT ALL

## 2024-02-13 ASSESSMENT — PAIN SCALES - GENERAL: PAINLEVEL_OUTOF10: 7

## 2024-02-13 ASSESSMENT — PAIN DESCRIPTION - DESCRIPTORS: DESCRIPTORS: ACHING

## 2024-02-13 ASSESSMENT — PAIN - FUNCTIONAL ASSESSMENT: PAIN_FUNCTIONAL_ASSESSMENT: 0-10

## 2024-02-13 NOTE — PROGRESS NOTES
MEDICATION NAME: norco  STRENGTH: 5/325mg  LAST FILL DATE: 11/10/23  DATE LAST TAKEN: 24 0.5 tab  QUANTITY FILLED: 90  QUANTITY REMAININ.5  COUNT COMPLETED BY: JHON FERNANDEZ RN and TREY GUIDO MD      UDS LAST COMPLETED: 2023  CONTROLLED SUBSTANCES AGREEMENT LAST SIGNED: 2023  ORT LAST COMPLETED:2024  Modified [employer] disability form filled out annually.

## 2024-02-13 NOTE — PROGRESS NOTES
Subjective   Patient ID: Joi Dickerson is a 38 y.o. female who presents for No chief complaint on file..  HPI  Patient here today for follow up and management of her chronic right shoulder pain from rotator cuff tear secondary to work related injury.  She is rating her pain as a 7/10.  She had another hearing to see Dr. Angeles but it was denied.  The patient is very frustrated that she can not access any healthcare for her shoulder.  She has been denied a second opinion from another surgeon.  She has been denied physical therapy.  She has been denied intervention.  She was told to take more opioids by  Angie.  She is frustrated because she feels that she is being told to take more opioids by non-phsycians and she does not want to take more opioids.  She would like to go back and have another consult with Dr. Walter.    Review of Systems   Constitutional: Negative.    HENT: Negative.     Eyes: Negative.    Respiratory: Negative.     Cardiovascular: Negative.    Gastrointestinal: Negative.    Endocrine: Negative.    Genitourinary: Negative.    Musculoskeletal:  Positive for arthralgias and myalgias.   Skin: Negative.    Allergic/Immunologic: Negative.    Neurological: Negative.    Hematological: Negative.    Psychiatric/Behavioral: Negative.         Objective   Physical Exam  Vitals and nursing note reviewed.   Constitutional:       Appearance: Normal appearance.   HENT:      Head: Normocephalic and atraumatic.      Right Ear: Ear canal and external ear normal.      Left Ear: Ear canal and external ear normal.      Nose: Nose normal.      Mouth/Throat:      Mouth: Mucous membranes are moist.      Pharynx: Oropharynx is clear.   Eyes:      Conjunctiva/sclera: Conjunctivae normal.      Pupils: Pupils are equal, round, and reactive to light.   Cardiovascular:      Rate and Rhythm: Normal rate.   Pulmonary:      Effort: Pulmonary effort is normal. No respiratory distress.   Musculoskeletal:      Right  shoulder: Tenderness present. Decreased range of motion. Decreased strength (4/5).        Arms:       Cervical back: Normal range of motion and neck supple.      Lumbar back: Tenderness present. Normal range of motion.      Comments: Lift Off Test (+) Right   Pain with adduction and external rotation     Skin:     General: Skin is warm and dry.   Neurological:      Mental Status: She is alert.   Psychiatric:         Mood and Affect: Mood normal.         Thought Content: Thought content normal.         Assessment/Plan   Problem List Items Addressed This Visit             ICD-10-CM       Musculoskeletal and Injuries    Rotator cuff tear, right - Primary M75.101     I nice discussion with the patient today our plan will be as follows.    Radiology: All available imaging was reviewed today.    Physically: Patient should continue home exercise program.    Psychologically: No issues at this time.    Medication: Patient does not require any refills of her medication at this time.  She continues to use medications in a safe manner on a as needed basis only at extremes of pain.    Duration: Greater than 1 year.    Intervention:  Patient is a excellent candidate for a right SPR PNS Implant targeting the right axillary nerve.  Patient was denied consult with Dr. Angeles, I will send her for re-evaluation with Dr. Cedeño.        C9 for consult with Dr. Leodan [facility]         Alber Aguero MD 02/13/24 12:54 PM

## 2024-03-22 DIAGNOSIS — S46.011A TRAUMATIC INCOMPLETE TEAR OF RIGHT ROTATOR CUFF, INITIAL ENCOUNTER: Primary | ICD-10-CM

## 2024-03-22 RX ORDER — HYDROCODONE BITARTRATE AND ACETAMINOPHEN 5; 325 MG/1; MG/1
1 TABLET ORAL EVERY 8 HOURS PRN
Qty: 90 TABLET | Refills: 0 | Status: SHIPPED | OUTPATIENT
Start: 2024-03-22 | End: 2024-04-21

## 2024-04-22 ENCOUNTER — APPOINTMENT (OUTPATIENT)
Dept: PRIMARY CARE | Facility: CLINIC | Age: 39
End: 2024-04-22
Payer: COMMERCIAL

## 2024-06-03 ENCOUNTER — LAB (OUTPATIENT)
Dept: LAB | Facility: LAB | Age: 39
End: 2024-06-03
Payer: COMMERCIAL

## 2024-06-03 ENCOUNTER — OFFICE VISIT (OUTPATIENT)
Dept: PRIMARY CARE | Facility: CLINIC | Age: 39
End: 2024-06-03
Payer: COMMERCIAL

## 2024-06-03 VITALS
DIASTOLIC BLOOD PRESSURE: 70 MMHG | OXYGEN SATURATION: 99 % | SYSTOLIC BLOOD PRESSURE: 124 MMHG | TEMPERATURE: 97.7 F | HEART RATE: 90 BPM | WEIGHT: 208 LBS | BODY MASS INDEX: 33.43 KG/M2 | HEIGHT: 66 IN

## 2024-06-03 DIAGNOSIS — M15.9 PRIMARY OSTEOARTHRITIS INVOLVING MULTIPLE JOINTS: ICD-10-CM

## 2024-06-03 DIAGNOSIS — R73.9 HYPERGLYCEMIA: ICD-10-CM

## 2024-06-03 DIAGNOSIS — K21.9 GASTROESOPHAGEAL REFLUX DISEASE WITHOUT ESOPHAGITIS: ICD-10-CM

## 2024-06-03 DIAGNOSIS — M25.511 CHRONIC RIGHT SHOULDER PAIN: ICD-10-CM

## 2024-06-03 DIAGNOSIS — D51.0 PA (PERNICIOUS ANEMIA): ICD-10-CM

## 2024-06-03 DIAGNOSIS — E55.9 VITAMIN D DEFICIENCY: ICD-10-CM

## 2024-06-03 DIAGNOSIS — G89.29 CHRONIC RIGHT SHOULDER PAIN: ICD-10-CM

## 2024-06-03 DIAGNOSIS — F41.9 ANXIETY: ICD-10-CM

## 2024-06-03 DIAGNOSIS — E53.8 VITAMIN B12 DEFICIENCY: ICD-10-CM

## 2024-06-03 DIAGNOSIS — E78.2 MIXED HYPERLIPIDEMIA: ICD-10-CM

## 2024-06-03 DIAGNOSIS — Z01.89 ENCOUNTER FOR ROUTINE LABORATORY TESTING: ICD-10-CM

## 2024-06-03 DIAGNOSIS — G43.909 MIGRAINE SYNDROME: ICD-10-CM

## 2024-06-03 DIAGNOSIS — E66.9 CLASS 1 OBESITY WITHOUT SERIOUS COMORBIDITY WITH BODY MASS INDEX (BMI) OF 33.0 TO 33.9 IN ADULT, UNSPECIFIED OBESITY TYPE: Primary | ICD-10-CM

## 2024-06-03 PROBLEM — Z78.0 MENOPAUSE: Status: ACTIVE | Noted: 2023-09-19

## 2024-06-03 PROBLEM — S39.012A STRAIN OF LUMBAR REGION: Status: RESOLVED | Noted: 2023-09-19 | Resolved: 2024-06-03

## 2024-06-03 PROBLEM — S32.2XXA: Status: RESOLVED | Noted: 2023-09-19 | Resolved: 2024-06-03

## 2024-06-03 PROBLEM — M25.519 SHOULDER PAIN: Status: ACTIVE | Noted: 2024-06-03

## 2024-06-03 LAB
25(OH)D3 SERPL-MCNC: 21 NG/ML (ref 31–100)
ANION GAP SERPL CALC-SCNC: 12 MMOL/L
BASOPHILS # BLD AUTO: 0.11 X10*3/UL (ref 0–0.1)
BASOPHILS NFR BLD AUTO: 1.1 %
BUN SERPL-MCNC: 11 MG/DL (ref 8–25)
CALCIUM SERPL-MCNC: 9.5 MG/DL (ref 8.5–10.4)
CHLORIDE SERPL-SCNC: 103 MMOL/L (ref 97–107)
CO2 SERPL-SCNC: 26 MMOL/L (ref 24–31)
CREAT SERPL-MCNC: 0.7 MG/DL (ref 0.4–1.6)
EGFRCR SERPLBLD CKD-EPI 2021: >90 ML/MIN/1.73M*2
EOSINOPHIL # BLD AUTO: 0.41 X10*3/UL (ref 0–0.7)
EOSINOPHIL NFR BLD AUTO: 4 %
ERYTHROCYTE [DISTWIDTH] IN BLOOD BY AUTOMATED COUNT: 13 % (ref 11.5–14.5)
GLUCOSE SERPL-MCNC: 93 MG/DL (ref 65–99)
HCT VFR BLD AUTO: 46.4 % (ref 36–46)
HGB BLD-MCNC: 14.9 G/DL (ref 12–16)
IMM GRANULOCYTES # BLD AUTO: 0.02 X10*3/UL (ref 0–0.7)
IMM GRANULOCYTES NFR BLD AUTO: 0.2 % (ref 0–0.9)
LYMPHOCYTES # BLD AUTO: 4.79 X10*3/UL (ref 1.2–4.8)
LYMPHOCYTES NFR BLD AUTO: 46.6 %
MCH RBC QN AUTO: 29.9 PG (ref 26–34)
MCHC RBC AUTO-ENTMCNC: 32.1 G/DL (ref 32–36)
MCV RBC AUTO: 93 FL (ref 80–100)
MONOCYTES # BLD AUTO: 0.7 X10*3/UL (ref 0.1–1)
MONOCYTES NFR BLD AUTO: 6.8 %
NEUTROPHILS # BLD AUTO: 4.25 X10*3/UL (ref 1.2–7.7)
NEUTROPHILS NFR BLD AUTO: 41.3 %
NRBC BLD-RTO: 0 /100 WBCS (ref 0–0)
PLATELET # BLD AUTO: 301 X10*3/UL (ref 150–450)
POTASSIUM SERPL-SCNC: 4.5 MMOL/L (ref 3.4–5.1)
RBC # BLD AUTO: 4.99 X10*6/UL (ref 4–5.2)
SODIUM SERPL-SCNC: 141 MMOL/L (ref 133–145)
VIT B12 SERPL-MCNC: 245 PG/ML (ref 211–946)
WBC # BLD AUTO: 10.3 X10*3/UL (ref 4.4–11.3)

## 2024-06-03 PROCEDURE — 85025 COMPLETE CBC W/AUTO DIFF WBC: CPT

## 2024-06-03 PROCEDURE — RXMED WILLOW AMBULATORY MEDICATION CHARGE

## 2024-06-03 PROCEDURE — 82306 VITAMIN D 25 HYDROXY: CPT

## 2024-06-03 PROCEDURE — 36415 COLL VENOUS BLD VENIPUNCTURE: CPT

## 2024-06-03 PROCEDURE — 99214 OFFICE O/P EST MOD 30 MIN: CPT | Performed by: STUDENT IN AN ORGANIZED HEALTH CARE EDUCATION/TRAINING PROGRAM

## 2024-06-03 PROCEDURE — 82607 VITAMIN B-12: CPT

## 2024-06-03 PROCEDURE — 3008F BODY MASS INDEX DOCD: CPT | Performed by: STUDENT IN AN ORGANIZED HEALTH CARE EDUCATION/TRAINING PROGRAM

## 2024-06-03 PROCEDURE — 80048 BASIC METABOLIC PNL TOTAL CA: CPT

## 2024-06-03 RX ORDER — TIZANIDINE 2 MG/1
1-2 TABLET ORAL NIGHTLY PRN
Qty: 30 TABLET | Refills: 0 | Status: SHIPPED | OUTPATIENT
Start: 2024-06-03

## 2024-06-03 RX ORDER — CYANOCOBALAMIN 1000 UG/ML
1000 INJECTION, SOLUTION INTRAMUSCULAR; SUBCUTANEOUS
Qty: 3 ML | Refills: 3 | Status: SHIPPED | OUTPATIENT
Start: 2024-06-03 | End: 2025-06-03

## 2024-06-03 RX ORDER — MULTIVITAMIN
1 TABLET ORAL DAILY
Start: 2024-06-03

## 2024-06-03 RX ORDER — HYDROCODONE BITARTRATE AND ACETAMINOPHEN 5; 325 MG/1; MG/1
1 TABLET ORAL AS NEEDED
COMMUNITY

## 2024-06-03 RX ORDER — TIZANIDINE 2 MG/1
1-2 TABLET ORAL EVERY 6 HOURS PRN
Qty: 30 TABLET | Refills: 0 | Status: SHIPPED | OUTPATIENT
Start: 2024-06-03 | End: 2024-06-03 | Stop reason: SDUPTHER

## 2024-06-03 RX ORDER — IBUPROFEN 200 MG
200-400 TABLET ORAL 2 TIMES DAILY PRN
Status: SHIPPED
Start: 2024-06-03

## 2024-06-03 RX ORDER — ACETAMINOPHEN 500 MG
2000 TABLET ORAL DAILY
Start: 2024-06-03

## 2024-06-03 ASSESSMENT — PATIENT HEALTH QUESTIONNAIRE - PHQ9
SUM OF ALL RESPONSES TO PHQ9 QUESTIONS 1 AND 2: 0
1. LITTLE INTEREST OR PLEASURE IN DOING THINGS: NOT AT ALL
2. FEELING DOWN, DEPRESSED OR HOPELESS: NOT AT ALL

## 2024-06-03 ASSESSMENT — ENCOUNTER SYMPTOMS
CONSTITUTIONAL NEGATIVE: 1
ARTHRALGIAS: 1
GASTROINTESTINAL NEGATIVE: 1
RESPIRATORY NEGATIVE: 1
CARDIOVASCULAR NEGATIVE: 1
NECK PAIN: 1

## 2024-06-03 ASSESSMENT — PAIN SCALES - GENERAL: PAINLEVEL: 0-NO PAIN

## 2024-06-03 NOTE — PATIENT INSTRUCTIONS
- Significant medication and problem list reconciliation done today. Patient notably on an opiate that she is getting through pain management.  - Patient appears to be due for labwork. Ordered today.   - Will order labwork for the patient's next appointment.  - Vitals look great. Do not need to make changes at this time.  - Encouraged continued diet and exercise modification.  - Patient wanted to discuss weight loss options. Is interested in a GLP-1, will attempt to see if we can get this approved for the patient.  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 At this time, patient continues to deal with chronic R-shoulder pain. Is currently working with pain management on a game-plan. Discussed other different options with her, including physical therapy and muscle relaxers. Orders placed today, encouraged the patient to reach out to us in roughly one month to let us know how this is working for her.

## 2024-06-03 NOTE — PROGRESS NOTES
[facility]: [facility]  PROGRESS NOTE      Joi Dickerson is a 38 y.o. female that is presenting today for Establish Care.    Assessment/Plan   Diagnoses and all orders for this visit:  Class 1 obesity without serious comorbidity with body mass index (BMI) of 33.0 to 33.9 in adult, unspecified obesity type  Gastroesophageal reflux disease without esophagitis  Primary osteoarthritis involving multiple joints  -     ibuprofen 200 mg tablet; Take 1-2 tablets (200-400 mg) by mouth 2 times a day as needed for mild pain (1 - 3), moderate pain (4 - 6) or headaches.  -     Referral to Physical Therapy; Future  -     tiZANidine (Zanaflex) 2 mg tablet; Take 0.5-1 tablets (1-2 mg) by mouth as needed at bedtime for muscle spasms.  Chronic right shoulder pain  -     ibuprofen 200 mg tablet; Take 1-2 tablets (200-400 mg) by mouth 2 times a day as needed for mild pain (1 - 3), moderate pain (4 - 6) or headaches.  -     Referral to Physical Therapy; Future  -     tiZANidine (Zanaflex) 2 mg tablet; Take 0.5-1 tablets (1-2 mg) by mouth as needed at bedtime for muscle spasms.  PA (pernicious anemia)  -     cyanocobalamin (Vitamin B-12) 1,000 mcg/mL injection; Inject 1 mL (1,000 mcg) into the muscle every 30 (thirty) days.  -     CBC and Auto Differential; Future  -     Vitamin B12; Future  -     Folate; Future  -     CBC and Auto Differential; Future  -     Vitamin B12; Future  -     multivitamin tablet; Take 1 tablet by mouth once daily. One-A-Day Women's Complete Multivitamin  Vitamin B12 deficiency  -     cyanocobalamin (Vitamin B-12) 1,000 mcg/mL injection; Inject 1 mL (1,000 mcg) into the muscle every 30 (thirty) days.  -     Vitamin B12; Future  -     Folate; Future  -     Vitamin B12; Future  -     multivitamin tablet; Take 1 tablet by mouth once daily. One-A-Day Women's Complete Multivitamin  Mixed hyperlipidemia  -     Lipid Panel; Future  Vitamin D deficiency  -     Vitamin D 25-Hydroxy,Total (for  aerosol mask eval of Vitamin D levels); Future  -     cholecalciferol (Vitamin D3) 50 mcg (2,000 unit) capsule; Take 1 capsule (50 mcg) by mouth once daily.  -     Vitamin D 25-Hydroxy,Total (for eval of Vitamin D levels); Future  -     multivitamin tablet; Take 1 tablet by mouth once daily. One-A-Day Women's Complete Multivitamin  Migraine syndrome  -     Referral to Physical Therapy; Future  -     multivitamin tablet; Take 1 tablet by mouth once daily. One-A-Day Women's Complete Multivitamin  -     tiZANidine (Zanaflex) 2 mg tablet; Take 0.5-1 tablets (1-2 mg) by mouth as needed at bedtime for muscle spasms.  Anxiety  -     TSH with reflex to Free T4 if abnormal; Future  Encounter for routine laboratory testing  -     Follow Up In Primary Care; Future  -     CBC and Auto Differential; Future  -     Basic Metabolic Panel; Future  -     Hepatic Function Panel; Future  -     Lipid Panel; Future  -     Hemoglobin A1C; Future  -     Vitamin D 25-Hydroxy,Total (for eval of Vitamin D levels); Future  -     TSH with reflex to Free T4 if abnormal; Future  -     Vitamin B12; Future  -     Folate; Future  -     CBC and Auto Differential; Future  -     Basic Metabolic Panel; Future  -     Vitamin D 25-Hydroxy,Total (for eval of Vitamin D levels); Future  -     Vitamin B12; Future  Hyperglycemia  -     Hemoglobin A1C; Future    Current Outpatient Medications   Medication Instructions    cholecalciferol (VITAMIN D3) 50 mcg, oral, Daily    cyanocobalamin (VITAMIN B-12) 1,000 mcg, intramuscular, Every 30 days    HYDROcodone-acetaminophen (Norco) 5-325 mg tablet 1 tablet, oral, As needed, 1/2 tablet only when needed     ibuprofen 200-400 mg, oral, 2 times daily PRN    multivitamin tablet 1 tablet, oral, Daily, One-A-Day Women's Complete Multivitamin    tiZANidine (ZANAFLEX) 1-2 mg, oral, Nightly PRN     - Significant medication and problem list reconciliation done today. Patient notably on an opiate that she is getting through pain  management.  - Patient appears to be due for labwork. Ordered today.   - Will order labwork for the patient's next appointment.  - Vitals look great. Do not need to make changes at this time.  - Encouraged continued diet and exercise modification.  - Patient wanted to discuss weight loss options. Is interested in a GLP-1, will attempt to see if we can get this approved for the patient.  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 At this time, patient continues to deal with chronic R-shoulder pain. Is currently working with pain management on a game-plan. Discussed other different options with her, including physical therapy and muscle relaxers. Orders placed today, encouraged the patient to reach out to us in roughly one month to let us know how this is working for her.    Subjective   - The patient otherwise feels well and denies any acute symptoms or concerns at this time.  - The patient denies any changes or progression of their chronic medical problems.  - The patient denies any problems or concerns with their medications.      Review of Systems   Constitutional: Negative.    Respiratory: Negative.     Cardiovascular: Negative.    Gastrointestinal: Negative.    Musculoskeletal:  Positive for arthralgias and neck pain.   All other systems reviewed and are negative.     Objective   Vitals:    06/03/24 1332   BP: 124/70   Pulse: 90   Temp: 36.5 °C (97.7 °F)   SpO2: 99%      Body mass index is 33.59 kg/m².  Physical Exam  Vitals and nursing note reviewed.   Constitutional:       General: She is not in acute distress.  Neck:      Vascular: No carotid bruit.   Cardiovascular:      Rate and Rhythm: Normal rate and regular rhythm.      Heart sounds: Normal heart sounds.   Pulmonary:      Effort: Pulmonary effort is normal.      Breath sounds: Normal breath sounds.   Musculoskeletal:         General: No swelling.   Neurological:      Mental Status: She  "is alert. Mental status is at baseline.   Psychiatric:         Mood and Affect: Mood normal.       Diagnostic Results   Lab Results   Component Value Date    GLUCOSE 72 (L) 12/09/2023    CALCIUM 9.4 12/09/2023     12/09/2023    K 4.3 12/09/2023    CO2 24 12/09/2023     12/09/2023    BUN 10 12/09/2023    CREATININE 0.61 12/09/2023     Lab Results   Component Value Date    ALT 15 12/09/2023    AST 15 12/09/2023    ALKPHOS 61 12/09/2023    BILITOT 0.4 12/09/2023     Lab Results   Component Value Date    WBC 9.2 12/09/2023    HGB 15.0 12/09/2023    HCT 48.9 (H) 12/09/2023    MCV 98 12/09/2023     12/09/2023     Lab Results   Component Value Date    CHOL 188 12/09/2023    CHOL 216 (H) 05/13/2022    CHOL 226 (H) 02/19/2021     Lab Results   Component Value Date    HDL 42.7 12/09/2023    HDL 41 (L) 05/13/2022    HDL 41 (L) 02/19/2021     Lab Results   Component Value Date    LDLCALC 99 12/09/2023    LDLCALC 132 (H) 05/13/2022    LDLCALC 120 02/19/2021     Lab Results   Component Value Date    TRIG 234 (H) 12/09/2023    TRIG 216 (H) 05/13/2022    TRIG 324 (H) 02/19/2021     No components found for: \"CHOLHDL\"  Lab Results   Component Value Date    HGBA1C 5.0 12/09/2023     Other labs not included in the list above were reviewed either before or during this encounter.    History    Past Medical History:   Diagnosis Date    Anemia     Arthritis     Headache     Unspecified injury of shoulder and upper arm, unspecified arm, initial encounter 07/29/2015    Shoulder injury     Past Surgical History:   Procedure Laterality Date    APPENDECTOMY  2007    CHOLECYSTECTOMY  11/28/2016    Cholecystectomy    HYSTERECTOMY  07/29/2015    Hysterectomy    SHOULDER SURGERY  07/29/2015    Shoulder Surgery    UMBILICAL HERNIA REPAIR  07/29/2015    Umbilical Hernia Repair     Family History   Problem Relation Name Age of Onset    Other (htn) Mother Eleanor     Other (morbid obesity) Mother Eleanor     Hypertension Mother Eleanor     " Cancer Father Father     Other (morbid obesity [Other]) Sister      Other (morbid obesity [Other]) Brother      Other (morbid obesity [Other]) Son      Other (htn) Son      Ovarian cancer Other family history     Thyroid disease Other family history     Other (htn) Other family history      Social History     Socioeconomic History    Marital status:      Spouse name: Not on file    Number of children: Not on file    Years of education: Not on file    Highest education level: Not on file   Occupational History    Not on file   Tobacco Use    Smoking status: Every Day     Current packs/day: 0.50     Average packs/day: 0.5 packs/day for 15.0 years (7.5 ttl pk-yrs)     Types: Cigarettes    Smokeless tobacco: Never   Vaping Use    Vaping status: Never Used   Substance and Sexual Activity    Alcohol use: Never    Drug use: Never    Sexual activity: Yes     Partners: Choose not to disclose     Birth control/protection: Post-menopausal   Other Topics Concern    Not on file   Social History Narrative    Not on file     Social Determinants of Health     Financial Resource Strain: Not on file   Food Insecurity: Not on file   Transportation Needs: Not on file   Physical Activity: Not on file   Stress: Not on file   Social Connections: Not on file   Intimate Partner Violence: Not on file   Housing Stability: Not on file     Allergies   Allergen Reactions    Oxycodone-Acetaminophen Other     Severe N/V    Prochlorperazine Other     Tachycardia      Dihydroergotamine Hallucinations     Tachycardia      Droperidol Other and Hallucinations     tachycardia    Metoclopramide Other     tachycardia    Prasterone (Dhea) Hallucinations    Albolene Other     hallucinations    Metoclopramide Hcl Anxiety     tachycardia     Current Outpatient Medications on File Prior to Visit   Medication Sig Dispense Refill    HYDROcodone-acetaminophen (Norco) 5-325 mg tablet Take 1 tablet by mouth if needed for severe pain (7 - 10). 1/2 tablet only  aerosol mask when needed      [DISCONTINUED] cyanocobalamin (Vitamin B-12) 1,000 mcg/mL injection Inject 1 mL (1,000 mcg) into the muscle every 30 (thirty) days. 3 mL 1    [DISCONTINUED] ibuprofen 200 mg tablet Take 2 tablets (400 mg) by mouth every 12 hours if needed. With food or milk      [DISCONTINUED] ascorbic acid (Vitamin C) 500 mg tablet As directed oral      [DISCONTINUED] ergocalciferol (Vitamin D-2) 1.25 MG (60894 UT) capsule Take 1 capsule (1,250 mcg) by mouth 1 (one) time per week.      [DISCONTINUED] MAGNESIUM ORAL Take 2 tablets by mouth once daily. With meal       No current facility-administered medications on file prior to visit.     Immunization History   Administered Date(s) Administered    Hepatitis B vaccine, pediatric/adolescent (RECOMBIVAX, ENGERIX) 06/14/2000    Tdap vaccine, age 7 year and older (BOOSTRIX, ADACEL) 05/03/2022     Patient's medical history was reviewed and updated either before or during this encounter.       Marco Antonio Mcnair MD

## 2024-06-06 ENCOUNTER — PHARMACY VISIT (OUTPATIENT)
Dept: PHARMACY | Facility: CLINIC | Age: 39
End: 2024-06-06
Payer: COMMERCIAL

## 2024-06-28 ENCOUNTER — TELEPHONE (OUTPATIENT)
Dept: PRIMARY CARE | Facility: CLINIC | Age: 39
End: 2024-06-28
Payer: COMMERCIAL

## 2024-06-28 NOTE — TELEPHONE ENCOUNTER
Mary Jane at [facility] called  and stated that you put a order in for a mammogram screening and it should be a diagnostic because it is a six month follow up  Mary Jane    [phone]   Wound Check/Suture Removal

## 2024-07-01 DIAGNOSIS — R92.8 ABNORMAL MAMMOGRAM: Primary | ICD-10-CM

## 2024-07-01 NOTE — TELEPHONE ENCOUNTER
[facility] request an order for a bilateral mammogram, US if needed, dx R92.8 to be placed in Epic.

## 2024-07-03 ENCOUNTER — PATIENT MESSAGE (OUTPATIENT)
Dept: PRIMARY CARE | Facility: CLINIC | Age: 39
End: 2024-07-03
Payer: COMMERCIAL

## 2024-07-03 ENCOUNTER — TELEPHONE (OUTPATIENT)
Dept: PRIMARY CARE | Facility: CLINIC | Age: 39
End: 2024-07-03
Payer: COMMERCIAL

## 2024-07-03 DIAGNOSIS — E66.9 CLASS 1 OBESITY WITHOUT SERIOUS COMORBIDITY WITH BODY MASS INDEX (BMI) OF 33.0 TO 33.9 IN ADULT, UNSPECIFIED OBESITY TYPE: Primary | ICD-10-CM

## 2024-07-03 RX ORDER — SEMAGLUTIDE 0.25 MG/.5ML
0.25 INJECTION, SOLUTION SUBCUTANEOUS
Qty: 2 ML | Refills: 0 | Status: SHIPPED | OUTPATIENT
Start: 2024-07-07 | End: 2024-07-29

## 2024-07-22 ENCOUNTER — HOSPITAL ENCOUNTER (OUTPATIENT)
Dept: RADIOLOGY | Facility: HOSPITAL | Age: 39
Discharge: HOME | End: 2024-07-22
Payer: COMMERCIAL

## 2024-07-22 VITALS — WEIGHT: 208 LBS | HEIGHT: 66 IN | BODY MASS INDEX: 33.43 KG/M2

## 2024-07-22 DIAGNOSIS — R92.8 OTHER ABNORMAL AND INCONCLUSIVE FINDINGS ON DIAGNOSTIC IMAGING OF BREAST: ICD-10-CM

## 2024-07-22 DIAGNOSIS — R92.8 ABNORMAL MAMMOGRAM: ICD-10-CM

## 2024-07-22 PROCEDURE — 77066 DX MAMMO INCL CAD BI: CPT | Performed by: RADIOLOGY

## 2024-07-22 PROCEDURE — 77062 BREAST TOMOSYNTHESIS BI: CPT | Performed by: RADIOLOGY

## 2024-07-22 PROCEDURE — 77062 BREAST TOMOSYNTHESIS BI: CPT

## 2024-08-28 DIAGNOSIS — M25.511 CHRONIC RIGHT SHOULDER PAIN: ICD-10-CM

## 2024-08-28 DIAGNOSIS — G43.909 MIGRAINE SYNDROME: ICD-10-CM

## 2024-08-28 DIAGNOSIS — M15.9 PRIMARY OSTEOARTHRITIS INVOLVING MULTIPLE JOINTS: ICD-10-CM

## 2024-08-28 DIAGNOSIS — G89.29 CHRONIC RIGHT SHOULDER PAIN: ICD-10-CM

## 2024-08-28 PROCEDURE — RXMED WILLOW AMBULATORY MEDICATION CHARGE

## 2024-08-28 RX ORDER — TIZANIDINE 2 MG/1
1-2 TABLET ORAL NIGHTLY PRN
Qty: 30 TABLET | Refills: 0 | Status: SHIPPED | OUTPATIENT
Start: 2024-08-28

## 2024-09-03 ENCOUNTER — PHARMACY VISIT (OUTPATIENT)
Dept: PHARMACY | Facility: CLINIC | Age: 39
End: 2024-09-03
Payer: COMMERCIAL

## 2024-09-17 ENCOUNTER — OFFICE VISIT (OUTPATIENT)
Dept: PAIN MEDICINE | Facility: CLINIC | Age: 39
End: 2024-09-17
Payer: COMMERCIAL

## 2024-09-17 VITALS
RESPIRATION RATE: 16 BRPM | WEIGHT: 210 LBS | SYSTOLIC BLOOD PRESSURE: 124 MMHG | BODY MASS INDEX: 33.75 KG/M2 | DIASTOLIC BLOOD PRESSURE: 80 MMHG | HEIGHT: 66 IN

## 2024-09-17 DIAGNOSIS — G89.29 CHRONIC RIGHT SHOULDER PAIN: ICD-10-CM

## 2024-09-17 DIAGNOSIS — Z79.899 ENCOUNTER FOR LONG-TERM (CURRENT) USE OF HIGH-RISK MEDICATION: ICD-10-CM

## 2024-09-17 DIAGNOSIS — M75.101 ROTATOR CUFF SYNDROME OF RIGHT SHOULDER: Primary | ICD-10-CM

## 2024-09-17 DIAGNOSIS — M25.511 CHRONIC RIGHT SHOULDER PAIN: ICD-10-CM

## 2024-09-17 DIAGNOSIS — M15.9 PRIMARY OSTEOARTHRITIS INVOLVING MULTIPLE JOINTS: ICD-10-CM

## 2024-09-17 PROCEDURE — 99214 OFFICE O/P EST MOD 30 MIN: CPT | Performed by: ANESTHESIOLOGY

## 2024-09-17 PROCEDURE — 80307 DRUG TEST PRSMV CHEM ANLYZR: CPT | Mod: WESLAB | Performed by: ANESTHESIOLOGY

## 2024-09-17 PROCEDURE — 80346 BENZODIAZEPINES1-12: CPT | Mod: WESLAB | Performed by: ANESTHESIOLOGY

## 2024-09-17 RX ORDER — AMOXICILLIN AND CLAVULANATE POTASSIUM 875; 125 MG/1; MG/1
1 TABLET, FILM COATED ORAL
COMMUNITY
Start: 2024-09-11

## 2024-09-17 RX ORDER — HYDROCODONE BITARTRATE AND ACETAMINOPHEN 5; 325 MG/1; MG/1
1 TABLET ORAL 2 TIMES DAILY PRN
Qty: 60 TABLET | Refills: 0 | Status: SHIPPED | OUTPATIENT
Start: 2024-09-17 | End: 2024-10-17

## 2024-09-17 ASSESSMENT — ENCOUNTER SYMPTOMS
CONSTITUTIONAL NEGATIVE: 1
GASTROINTESTINAL NEGATIVE: 1
ALLERGIC/IMMUNOLOGIC NEGATIVE: 1
PSYCHIATRIC NEGATIVE: 1
EYES NEGATIVE: 1
CARDIOVASCULAR NEGATIVE: 1
RESPIRATORY NEGATIVE: 1
ENDOCRINE NEGATIVE: 1
HEMATOLOGIC/LYMPHATIC NEGATIVE: 1
ARTHRALGIAS: 1
NEUROLOGICAL NEGATIVE: 1
MYALGIAS: 1

## 2024-09-17 ASSESSMENT — PATIENT HEALTH QUESTIONNAIRE - PHQ9
SUM OF ALL RESPONSES TO PHQ9 QUESTIONS 1 AND 2: 0
2. FEELING DOWN, DEPRESSED OR HOPELESS: NOT AT ALL
1. LITTLE INTEREST OR PLEASURE IN DOING THINGS: NOT AT ALL

## 2024-09-17 ASSESSMENT — PAIN SCALES - GENERAL
PAINLEVEL_OUTOF10: 4
PAINLEVEL: 4

## 2024-09-17 ASSESSMENT — PAIN - FUNCTIONAL ASSESSMENT: PAIN_FUNCTIONAL_ASSESSMENT: 0-10

## 2024-09-17 ASSESSMENT — PAIN DESCRIPTION - DESCRIPTORS: DESCRIPTORS: ACHING

## 2024-09-17 ASSESSMENT — LIFESTYLE VARIABLES: TOTAL SCORE: 3

## 2024-09-17 NOTE — PROGRESS NOTES
Subjective   Patient ID: Joi Dickerson is a 38 y.o. female who presents for Shoulder Pain.  Shoulder Pain       Here today for follow-up for her right shoulder.  She reports that she is going to be closing her Worker's Comp. claim and will be moving forward with having a new surgical consultation with Dr. Paz.  She does need refills of her medications.  She has not had a refill since March she is used them sparingly over the summer.  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  She has pain with range of motion of the right shoulder.  Sleeping can be challenging for her.  Overall she has been stable over the summer.  Pain today is a 3 out of 10.  Patient very happy with current care plan.  Reports no adverse side effects to current medication regimen.  Review of Systems   Constitutional: Negative.    HENT: Negative.     Eyes: Negative.    Respiratory: Negative.     Cardiovascular: Negative.    Gastrointestinal: Negative.    Endocrine: Negative.    Genitourinary: Negative.    Musculoskeletal:  Positive for arthralgias and myalgias.   Skin: Negative.    Allergic/Immunologic: Negative.    Neurological: Negative.    Hematological: Negative.    Psychiatric/Behavioral: Negative.         Objective   Physical Exam  Vitals and nursing note reviewed.   Constitutional:       Appearance: Normal appearance.   HENT:      Head: Normocephalic and atraumatic.      Right Ear: Ear canal and external ear normal.      Left Ear: Ear canal and external ear normal.      Nose: Nose normal.      Mouth/Throat:      Mouth: Mucous membranes are moist.      Pharynx: Oropharynx is clear.   Eyes:      Conjunctiva/sclera: Conjunctivae normal.      Pupils: Pupils are equal, round, and reactive to light.   Cardiovascular:      Rate and Rhythm: Normal rate.   Pulmonary:      Effort: Pulmonary effort is normal. No respiratory distress.   Musculoskeletal:      Right shoulder: Tenderness present. Decreased range of  motion. Decreased strength (4/5).        Arms:       Cervical back: Normal range of motion and neck supple.      Lumbar back: Tenderness present. Normal range of motion.      Comments: Lift Off Test (+) Right   Pain with adduction and external rotation     Skin:     General: Skin is warm and dry.   Neurological:      Mental Status: She is alert.   Psychiatric:         Mood and Affect: Mood normal.         Thought Content: Thought content normal.         Assessment/Plan   Problem List Items Addressed This Visit    None  Visit Diagnoses         Codes    Rotator cuff syndrome of right shoulder    -  Primary M75.101          I nice discussion with the patient today our plan will be as follows.    Radiology: All available imaging was reviewed today.    Physically: Patient should continue home exercise program.    Psychologically: No issues at this time.    Medication: I will refill the patient's opioids today for 2 month.  The patient continues to see benefit and improvement in their quality of life and ability to maintain ADLs.  Patient educated about the risks of taking opioids and operating a motor vehicle.  Patient reports no adverse side effects to current medication regimen.  Current regimen does allow patient to maintain ADLs.  Patient reports no new neurologic symptoms, new pain areas, or exacerbation in pain today.  Patient reports they are happy with current treatment care path.    [place] was reviewed and was consistent with the history.    Patient has been educated on the risks, benefits, and alternatives of controlled substances as well as the proper way to store these medications.  The patient and I discussed the nature of this medication and its side effects.  We discussed tolerance, physical dependence, psychological dependence, addiction and opioid-induced hyperalgesia.  We discussed the potential need to wean from this medication.  We discussed the availability of programs that can help with this process  if necessary.  We discussed safety issues related to opioids including safe storage.  We discussed the fact that the patient should not drive an automobile or operate heavy machinery while taking this medication.  A prescription for naloxone was offered to the patient.  The patient will be re-evaluated for the need to continue opioid therapy in 60-90 days.  A urine or saliva specimen was obtained for toxicology screening  A opioid agreement and care plan was presented to the patient today.  The patient had the opportunity to read through the agreement during the office visit and has signed the agreement today.  A copy of the agreement was given to the patient to take home for their records.        Duration: Greater than 1 year.    Intervention: Patient have follow-up surgical consultation with Dr. Paz.           Alber Aguero MD 09/17/24 11:47 AM

## 2024-09-17 NOTE — PROGRESS NOTES
MEDICATION NAME: norco  STRENGTH: 5/325mg  LAST FILL DATE: 3/22/24  DATE LAST TAKEN: 24  QUANTITY FILLED: 90  QUANTITY REMAININ  COUNT COMPLETED BY: JHON FERNANDEZ RN and TREY GUIDO MD      UDS LAST COMPLETED: 2023  CONTROLLED SUBSTANCES AGREEMENT LAST SIGNED: 2023  ORT LAST COMPLETED:2024  Modified Oswestry disability form filled out annually.

## 2024-09-18 LAB
AMPHETAMINES UR QL SCN: NORMAL
BARBITURATES UR QL SCN: NORMAL
BZE UR QL SCN: NORMAL
CANNABINOIDS UR QL SCN: NORMAL
CREAT UR-MCNC: 39.9 MG/DL (ref 20–320)
PCP UR QL SCN: NORMAL

## 2024-09-20 LAB
1OH-MIDAZOLAM UR CFM-MCNC: <25 NG/ML
6MAM UR CFM-MCNC: <25 NG/ML
7AMINOCLONAZEPAM UR CFM-MCNC: <25 NG/ML
A-OH ALPRAZ UR CFM-MCNC: <25 NG/ML
ALPRAZ UR CFM-MCNC: <25 NG/ML
CHLORDIAZEP UR CFM-MCNC: <25 NG/ML
CLONAZEPAM UR CFM-MCNC: <25 NG/ML
CODEINE UR CFM-MCNC: <50 NG/ML
DIAZEPAM UR CFM-MCNC: <25 NG/ML
EDDP UR CFM-MCNC: <25 NG/ML
FENTANYL UR CFM-MCNC: <2.5 NG/ML
HYDROCODONE CTO UR CFM-MCNC: 207 NG/ML
HYDROMORPHONE UR CFM-MCNC: 56 NG/ML
LORAZEPAM UR CFM-MCNC: <25 NG/ML
METHADONE UR CFM-MCNC: <25 NG/ML
MIDAZOLAM UR CFM-MCNC: <25 NG/ML
MORPHINE UR CFM-MCNC: <50 NG/ML
NORDIAZEPAM UR CFM-MCNC: <25 NG/ML
NORFENTANYL UR CFM-MCNC: <2.5 NG/ML
NORHYDROCODONE UR CFM-MCNC: 235 NG/ML
NOROXYCODONE UR CFM-MCNC: <25 NG/ML
NORTRAMADOL UR-MCNC: <50 NG/ML
OXAZEPAM UR CFM-MCNC: <25 NG/ML
OXYCODONE UR CFM-MCNC: <25 NG/ML
OXYMORPHONE UR CFM-MCNC: <25 NG/ML
TEMAZEPAM UR CFM-MCNC: <25 NG/ML
TRAMADOL UR CFM-MCNC: <50 NG/ML
ZOLPIDEM UR CFM-MCNC: <25 NG/ML
ZOLPIDEM UR-MCNC: <25 NG/ML

## 2024-09-30 DIAGNOSIS — G43.909 MIGRAINE SYNDROME: ICD-10-CM

## 2024-09-30 DIAGNOSIS — M25.511 CHRONIC RIGHT SHOULDER PAIN: ICD-10-CM

## 2024-09-30 DIAGNOSIS — G89.29 CHRONIC RIGHT SHOULDER PAIN: ICD-10-CM

## 2024-09-30 DIAGNOSIS — M15.0 PRIMARY OSTEOARTHRITIS INVOLVING MULTIPLE JOINTS: ICD-10-CM

## 2024-09-30 RX ORDER — TIZANIDINE 2 MG/1
1-2 TABLET ORAL NIGHTLY PRN
Qty: 30 TABLET | Refills: 0 | Status: SHIPPED | OUTPATIENT
Start: 2024-09-30

## 2024-10-15 ENCOUNTER — PHARMACY VISIT (OUTPATIENT)
Dept: PHARMACY | Facility: CLINIC | Age: 39
End: 2024-10-15
Payer: COMMERCIAL

## 2024-10-15 PROCEDURE — RXMED WILLOW AMBULATORY MEDICATION CHARGE

## 2024-10-23 ENCOUNTER — APPOINTMENT (OUTPATIENT)
Dept: PRIMARY CARE | Facility: CLINIC | Age: 39
End: 2024-10-23
Payer: COMMERCIAL

## 2024-11-26 DIAGNOSIS — M15.0 PRIMARY OSTEOARTHRITIS INVOLVING MULTIPLE JOINTS: ICD-10-CM

## 2024-11-26 DIAGNOSIS — M25.511 CHRONIC RIGHT SHOULDER PAIN: ICD-10-CM

## 2024-11-26 DIAGNOSIS — G89.29 CHRONIC RIGHT SHOULDER PAIN: ICD-10-CM

## 2024-11-26 DIAGNOSIS — G43.909 MIGRAINE SYNDROME: ICD-10-CM

## 2024-11-26 PROCEDURE — RXMED WILLOW AMBULATORY MEDICATION CHARGE

## 2024-11-26 RX ORDER — TIZANIDINE 2 MG/1
1-2 TABLET ORAL NIGHTLY PRN
Qty: 30 TABLET | Refills: 0 | Status: SHIPPED | OUTPATIENT
Start: 2024-11-26

## 2024-11-27 ENCOUNTER — PHARMACY VISIT (OUTPATIENT)
Dept: PHARMACY | Facility: CLINIC | Age: 39
End: 2024-11-27
Payer: COMMERCIAL

## 2024-12-02 ENCOUNTER — APPOINTMENT (OUTPATIENT)
Dept: PRIMARY CARE | Facility: CLINIC | Age: 39
End: 2024-12-02
Payer: COMMERCIAL

## 2024-12-16 ENCOUNTER — TELEPHONE (OUTPATIENT)
Dept: PAIN MEDICINE | Facility: CLINIC | Age: 39
End: 2024-12-16
Payer: COMMERCIAL

## 2024-12-16 DIAGNOSIS — S46.011A TRAUMATIC INCOMPLETE TEAR OF RIGHT ROTATOR CUFF, INITIAL ENCOUNTER: Primary | ICD-10-CM

## 2024-12-16 RX ORDER — HYDROCODONE BITARTRATE AND ACETAMINOPHEN 5; 325 MG/1; MG/1
1 TABLET ORAL 2 TIMES DAILY
Qty: 60 TABLET | Refills: 0 | Status: SHIPPED | OUTPATIENT
Start: 2024-12-16 | End: 2025-01-15

## 2024-12-23 NOTE — TELEPHONE ENCOUNTER
Call to patient to make a follow up appointment. Left message for her to call and schedule follow up with chucho De León before her next refill which is 01/15/2025.

## 2025-01-03 ENCOUNTER — APPOINTMENT (OUTPATIENT)
Dept: PRIMARY CARE | Facility: CLINIC | Age: 40
End: 2025-01-03
Payer: COMMERCIAL

## 2025-01-16 DIAGNOSIS — G89.29 CHRONIC RIGHT SHOULDER PAIN: ICD-10-CM

## 2025-01-16 DIAGNOSIS — G43.909 MIGRAINE SYNDROME: ICD-10-CM

## 2025-01-16 DIAGNOSIS — M25.511 CHRONIC RIGHT SHOULDER PAIN: ICD-10-CM

## 2025-01-16 DIAGNOSIS — M15.0 PRIMARY OSTEOARTHRITIS INVOLVING MULTIPLE JOINTS: ICD-10-CM

## 2025-01-16 RX ORDER — TIZANIDINE 2 MG/1
1-2 TABLET ORAL NIGHTLY PRN
Qty: 30 TABLET | Refills: 0 | Status: SHIPPED | OUTPATIENT
Start: 2025-01-16

## 2025-02-11 ENCOUNTER — OFFICE VISIT (OUTPATIENT)
Dept: PRIMARY CARE | Facility: CLINIC | Age: 40
End: 2025-02-11
Payer: COMMERCIAL

## 2025-02-11 VITALS
SYSTOLIC BLOOD PRESSURE: 122 MMHG | DIASTOLIC BLOOD PRESSURE: 62 MMHG | WEIGHT: 214 LBS | BODY MASS INDEX: 34.39 KG/M2 | HEIGHT: 66 IN | HEART RATE: 87 BPM | TEMPERATURE: 97.1 F | OXYGEN SATURATION: 98 %

## 2025-02-11 DIAGNOSIS — Z72.0 TOBACCO USE: ICD-10-CM

## 2025-02-11 DIAGNOSIS — K21.9 GASTROESOPHAGEAL REFLUX DISEASE WITHOUT ESOPHAGITIS: ICD-10-CM

## 2025-02-11 DIAGNOSIS — E53.8 VITAMIN B12 DEFICIENCY: ICD-10-CM

## 2025-02-11 DIAGNOSIS — E55.9 VITAMIN D DEFICIENCY: ICD-10-CM

## 2025-02-11 DIAGNOSIS — E66.811 CLASS 1 OBESITY WITHOUT SERIOUS COMORBIDITY WITH BODY MASS INDEX (BMI) OF 34.0 TO 34.9 IN ADULT, UNSPECIFIED OBESITY TYPE: ICD-10-CM

## 2025-02-11 DIAGNOSIS — M15.0 PRIMARY OSTEOARTHRITIS INVOLVING MULTIPLE JOINTS: ICD-10-CM

## 2025-02-11 DIAGNOSIS — Z12.31 ENCOUNTER FOR SCREENING MAMMOGRAM FOR BREAST CANCER: ICD-10-CM

## 2025-02-11 DIAGNOSIS — F41.9 ANXIETY: ICD-10-CM

## 2025-02-11 DIAGNOSIS — Z01.89 ENCOUNTER FOR ROUTINE LABORATORY TESTING: ICD-10-CM

## 2025-02-11 DIAGNOSIS — D51.0 PA (PERNICIOUS ANEMIA): ICD-10-CM

## 2025-02-11 DIAGNOSIS — Z00.00 ANNUAL PHYSICAL EXAM: Primary | ICD-10-CM

## 2025-02-11 DIAGNOSIS — R73.9 HYPERGLYCEMIA: ICD-10-CM

## 2025-02-11 DIAGNOSIS — R07.89 CHEST DISCOMFORT: ICD-10-CM

## 2025-02-11 DIAGNOSIS — E78.2 MIXED HYPERLIPIDEMIA: ICD-10-CM

## 2025-02-11 DIAGNOSIS — G43.909 MIGRAINE SYNDROME: ICD-10-CM

## 2025-02-11 DIAGNOSIS — Z23 ENCOUNTER FOR IMMUNIZATION: ICD-10-CM

## 2025-02-11 LAB
25(OH)D3+25(OH)D2 SERPL-MCNC: 14 NG/ML (ref 30–100)
ALBUMIN SERPL-MCNC: 4.3 G/DL (ref 3.6–5.1)
ALBUMIN/GLOB SERPL: 1.5 (CALC) (ref 1–2.5)
ALP SERPL-CCNC: 64 U/L (ref 31–125)
ALT SERPL-CCNC: 14 U/L (ref 6–29)
ANION GAP SERPL CALCULATED.4IONS-SCNC: 9 MMOL/L (CALC) (ref 7–17)
AST SERPL-CCNC: 17 U/L (ref 10–30)
BASOPHILS # BLD AUTO: 80 CELLS/UL (ref 0–200)
BASOPHILS NFR BLD AUTO: 1 %
BILIRUB DIRECT SERPL-MCNC: 0.1 MG/DL
BILIRUB INDIRECT SERPL-MCNC: 0.5 MG/DL (CALC) (ref 0.2–1.2)
BILIRUB SERPL-MCNC: 0.6 MG/DL (ref 0.2–1.2)
BUN SERPL-MCNC: 11 MG/DL (ref 7–25)
BUN/CREAT SERPL: NORMAL (CALC) (ref 6–22)
CALCIUM SERPL-MCNC: 9.6 MG/DL (ref 8.6–10.2)
CHLORIDE SERPL-SCNC: 106 MMOL/L (ref 98–110)
CHOLEST SERPL-MCNC: 211 MG/DL
CHOLEST/HDLC SERPL: 4.2 (CALC)
CO2 SERPL-SCNC: 25 MMOL/L (ref 20–32)
CREAT SERPL-MCNC: 0.55 MG/DL (ref 0.5–0.99)
EGFRCR SERPLBLD CKD-EPI 2021: 118 ML/MIN/1.73M2
EOSINOPHIL # BLD AUTO: 272 CELLS/UL (ref 15–500)
EOSINOPHIL NFR BLD AUTO: 3.4 %
ERYTHROCYTE [DISTWIDTH] IN BLOOD BY AUTOMATED COUNT: 12.5 % (ref 11–15)
EST. AVERAGE GLUCOSE BLD GHB EST-MCNC: 111 MG/DL
EST. AVERAGE GLUCOSE BLD GHB EST-SCNC: 6.2 MMOL/L
FOLATE SERPL-MCNC: 12.7 NG/ML
GLOBULIN SER CALC-MCNC: 2.9 G/DL (CALC) (ref 1.9–3.7)
GLUCOSE SERPL-MCNC: 81 MG/DL (ref 65–99)
HBA1C MFR BLD: 5.5 % OF TOTAL HGB
HCT VFR BLD AUTO: 44.9 % (ref 35–45)
HDLC SERPL-MCNC: 50 MG/DL
HGB BLD-MCNC: 14.9 G/DL (ref 11.7–15.5)
LDLC SERPL CALC-MCNC: 122 MG/DL (CALC)
LYMPHOCYTES # BLD AUTO: 3440 CELLS/UL (ref 850–3900)
LYMPHOCYTES NFR BLD AUTO: 43 %
MCH RBC QN AUTO: 29.7 PG (ref 27–33)
MCHC RBC AUTO-ENTMCNC: 33.2 G/DL (ref 32–36)
MCV RBC AUTO: 89.6 FL (ref 80–100)
MONOCYTES # BLD AUTO: 632 CELLS/UL (ref 200–950)
MONOCYTES NFR BLD AUTO: 7.9 %
NEUTROPHILS # BLD AUTO: 3576 CELLS/UL (ref 1500–7800)
NEUTROPHILS NFR BLD AUTO: 44.7 %
NONHDLC SERPL-MCNC: 161 MG/DL (CALC)
PLATELET # BLD AUTO: 274 THOUSAND/UL (ref 140–400)
PMV BLD REES-ECKER: 10.6 FL (ref 7.5–12.5)
POTASSIUM SERPL-SCNC: 4.7 MMOL/L (ref 3.5–5.3)
PROT SERPL-MCNC: 7.2 G/DL (ref 6.1–8.1)
RBC # BLD AUTO: 5.01 MILLION/UL (ref 3.8–5.1)
SODIUM SERPL-SCNC: 140 MMOL/L (ref 135–146)
TRIGL SERPL-MCNC: 239 MG/DL
TSH SERPL-ACNC: 1.82 MIU/L
VIT B12 SERPL-MCNC: 792 PG/ML (ref 200–1100)
WBC # BLD AUTO: 8 THOUSAND/UL (ref 3.8–10.8)

## 2025-02-11 PROCEDURE — 99396 PREV VISIT EST AGE 40-64: CPT | Performed by: STUDENT IN AN ORGANIZED HEALTH CARE EDUCATION/TRAINING PROGRAM

## 2025-02-11 PROCEDURE — 99214 OFFICE O/P EST MOD 30 MIN: CPT | Performed by: STUDENT IN AN ORGANIZED HEALTH CARE EDUCATION/TRAINING PROGRAM

## 2025-02-11 PROCEDURE — RXMED WILLOW AMBULATORY MEDICATION CHARGE

## 2025-02-11 PROCEDURE — 3008F BODY MASS INDEX DOCD: CPT | Performed by: STUDENT IN AN ORGANIZED HEALTH CARE EDUCATION/TRAINING PROGRAM

## 2025-02-11 PROCEDURE — 4004F PT TOBACCO SCREEN RCVD TLK: CPT | Performed by: STUDENT IN AN ORGANIZED HEALTH CARE EDUCATION/TRAINING PROGRAM

## 2025-02-11 RX ORDER — ERGOCALCIFEROL 1.25 MG/1
50000 CAPSULE ORAL WEEKLY
Qty: 12 CAPSULE | Refills: 0 | Status: SHIPPED | OUTPATIENT
Start: 2025-02-11 | End: 2025-05-07

## 2025-02-11 RX ORDER — BUPROPION HYDROCHLORIDE 150 MG/1
150 TABLET ORAL DAILY
Qty: 90 TABLET | Refills: 1 | Status: SHIPPED | OUTPATIENT
Start: 2025-02-11 | End: 2025-08-10

## 2025-02-11 RX ORDER — VIT C/E/ZN/COPPR/LUTEIN/ZEAXAN 250MG-90MG
5000 CAPSULE ORAL DAILY
Status: SHIPPED | COMMUNITY
Start: 2025-02-11

## 2025-02-11 RX ORDER — IBUPROFEN 200 MG
1 TABLET ORAL DAILY
Qty: 14 PATCH | Refills: 0 | Status: SHIPPED | OUTPATIENT
Start: 2025-02-12 | End: 2025-02-26

## 2025-02-11 RX ORDER — NICOTINE 7MG/24HR
1 PATCH, TRANSDERMAL 24 HOURS TRANSDERMAL EVERY 24 HOURS
Qty: 14 PATCH | Refills: 0 | Status: SHIPPED | OUTPATIENT
Start: 2025-02-27 | End: 2025-03-13

## 2025-02-11 ASSESSMENT — PROMIS GLOBAL HEALTH SCALE
RATE_GENERAL_HEALTH: GOOD
RATE_MENTAL_HEALTH: GOOD
RATE_QUALITY_OF_LIFE: VERY GOOD
CARRYOUT_SOCIAL_ACTIVITIES: VERY GOOD
RATE_AVERAGE_FATIGUE: MILD
EMOTIONAL_PROBLEMS: SOMETIMES
CARRYOUT_PHYSICAL_ACTIVITIES: MOSTLY
RATE_SOCIAL_SATISFACTION: GOOD
RATE_PHYSICAL_HEALTH: GOOD
RATE_AVERAGE_PAIN: 7

## 2025-02-11 ASSESSMENT — ENCOUNTER SYMPTOMS
ALLERGIC/IMMUNOLOGIC NEGATIVE: 1
HEMATOLOGIC/LYMPHATIC NEGATIVE: 1
MUSCULOSKELETAL NEGATIVE: 1
NEUROLOGICAL NEGATIVE: 1
EYES NEGATIVE: 1
GASTROINTESTINAL NEGATIVE: 1
CONSTITUTIONAL NEGATIVE: 1
CARDIOVASCULAR NEGATIVE: 1
ENDOCRINE NEGATIVE: 1
PSYCHIATRIC NEGATIVE: 1
RESPIRATORY NEGATIVE: 1

## 2025-02-11 ASSESSMENT — PATIENT HEALTH QUESTIONNAIRE - PHQ9
2. FEELING DOWN, DEPRESSED OR HOPELESS: NOT AT ALL
1. LITTLE INTEREST OR PLEASURE IN DOING THINGS: NOT AT ALL
SUM OF ALL RESPONSES TO PHQ9 QUESTIONS 1 AND 2: 0

## 2025-02-11 ASSESSMENT — PAIN SCALES - GENERAL: PAINLEVEL_OUTOF10: 4

## 2025-02-11 NOTE — PATIENT INSTRUCTIONS
- Patient is describing new, infrequent, episodic events where she is having shooting pains radiating from shoulder to shoulder and subsequently radiates substernally. Patient was somewhat active during this time. Stopped and it got better. Tried a similar activity a few days later and had a similar results. While I do suspect that this is musculoskeletal in nature while speaking with the patient, she is concerned that this is related to her heart and is requesting a referral to cardiology.  - Blood pressure at goal today.  - Encouraged continued dietary, exercise, and lifestyle modification.  - Discussed the benefits associated with smoking cessation. Patient notes that she is ready to quit. Discussed with her that she cannot smoke cigarettes if on the nicotine replacement therapy. Patient voiced understanding.  - Significant medication and problem list reconciliation done today.     Discussed routine and/or preventative care with the patient as outlined below:  - Labwork:   - Patient had labwork done for this appointment. Discussed today.    - Cholesterol mildly abnormal but relatively stable compared to last year.   - Vitamin D remains low. Prescription sent. Will also have her increase the dosage of her OTC supplement.  - Will order labwork for the patient's next appointment. Encouraged the patient to get this labwork done one week prior to the next appointment.  - Imaging:   - Mammogram: Will be due in the Summer. Ordered today.  - Immunizations:   - Patient does not appear to be due for routine immunizations at this time.

## 2025-02-11 NOTE — PROGRESS NOTES
[facility]: [facility]  PHYSICAL EXAM      Joi Dickerson is a 39 y.o. female that is presenting today for Annual Exam.    Assessment/Plan   - Patient is describing new, infrequent, episodic events where she is having shooting pains radiating from shoulder to shoulder and subsequently radiates substernally. Patient was somewhat active during this time. Stopped and it got better. Tried a similar activity a few days later and had a similar results. While I do suspect that this is musculoskeletal in nature while speaking with the patient, she is concerned that this is related to her heart and is requesting a referral to cardiology.  - Blood pressure at goal today.  - Encouraged continued dietary, exercise, and lifestyle modification.  - Discussed the benefits associated with smoking cessation. Patient notes that she is ready to quit. Discussed with her that she cannot smoke cigarettes if on the nicotine replacement therapy. Patient voiced understanding.  - Significant medication and problem list reconciliation done today.     Discussed routine and/or preventative care with the patient as outlined below:  - Labwork:   - Patient had labwork done for this appointment. Discussed today.    - Cholesterol mildly abnormal but relatively stable compared to last year.   - Vitamin D remains low. Prescription sent. Will also have her increase the dosage of her OTC supplement.  - Will order labwork for the patient's next appointment. Encouraged the patient to get this labwork done one week prior to the next appointment.  - Imaging:   - Mammogram: Will be due in the Summer. Ordered today.  - Immunizations:   - Patient does not appear to be due for routine immunizations at this time.     Diagnoses and all orders for this visit:  Annual physical exam  -     Follow Up In Primary Care  Encounter for screening mammogram for breast cancer  -     BI mammo bilateral screening tomosynthesis; Future  Encounter for  routine laboratory testing  Hyperglycemia  -     Hemoglobin A1C; Future  Encounter for immunization  Class 1 obesity without serious comorbidity with body mass index (BMI) of 34.0 to 34.9 in adult, unspecified obesity type  Gastroesophageal reflux disease without esophagitis  Primary osteoarthritis involving multiple joints  PA (pernicious anemia)  Vitamin B12 deficiency  -     Vitamin B12; Future  Mixed hyperlipidemia  -     Referral to Cardiology; Future  -     CBC and Auto Differential; Future  -     Basic Metabolic Panel; Future  -     Hepatic Function Panel; Future  -     Lipid Panel; Future  -     TSH with reflex to Free T4 if abnormal; Future  Vitamin D deficiency  -     Vitamin D 25-Hydroxy,Total (for eval of Vitamin D levels); Future  -     ergocalciferol (Vitamin D-2) 1.25 MG (72267 UT) capsule; Take 1 capsule (50,000 Units) by mouth 1 (one) time per week.  -     Vitamin D 25-Hydroxy,Total (for eval of Vitamin D levels); Future  -     cholecalciferol (Vitamin D-3) 125 mcg (5000 UT) capsule; Take 1 capsule (125 mcg) by mouth once daily.  Migraine syndrome  Chest discomfort  -     Referral to Cardiology; Future  Tobacco use  -     Referral to Cardiology; Future  -     nicotine (Nicoderm CQ) 14 mg/24 hr patch; Place 1 patch over 24 hours on the skin once daily for 14 days. Do not start before February 12, 2025.  -     nicotine (Nicoderm CQ) 7 mg/24 hr patch; Place 1 patch over 24 hours on the skin once every 24 hours for 14 days. Do not fill before February 27, 2025.  -     buPROPion XL (Wellbutrin XL) 150 mg 24 hr tablet; Take 1 tablet (150 mg) by mouth once daily. Do not crush, chew, or split.  Anxiety  -     buPROPion XL (Wellbutrin XL) 150 mg 24 hr tablet; Take 1 tablet (150 mg) by mouth once daily. Do not crush, chew, or split.    Current Outpatient Medications   Medication Instructions    buPROPion XL (WELLBUTRIN XL) 150 mg, oral, Daily, Do not crush, chew, or split.    cholecalciferol (VITAMIN D-3) 125  mcg, oral, Daily    cyanocobalamin (VITAMIN B-12) 1,000 mcg, intramuscular, Every 30 days    ergocalciferol (VITAMIN D-2) 50,000 Units, oral, Weekly    ibuprofen 200-400 mg, oral, 2 times daily PRN    multivitamin tablet 1 tablet, oral, Daily, One-A-Day Women's Complete Multivitamin    [START ON 2/12/2025] nicotine (Nicoderm CQ) 14 mg/24 hr patch Place 1 patch over 24 hours on the skin once daily for 14 days. Do not start before February 12, 2025.    [START ON 2/27/2025] nicotine (Nicoderm CQ) 7 mg/24 hr patch 1 patch, transdermal, Every 24 hours    tiZANidine (ZANAFLEX) 1-2 mg, oral, Nightly PRN     Subjective   - The patient otherwise feels well and denies any acute symptoms or concerns at this time.  - The patient denies any changes or progression of their chronic medical problems.  - The patient denies any problems or concerns with their medications.      Review of Systems   Constitutional: Negative.    HENT: Negative.     Eyes: Negative.    Respiratory: Negative.     Cardiovascular: Negative.    Gastrointestinal: Negative.    Endocrine: Negative.    Genitourinary: Negative.    Musculoskeletal: Negative.    Skin: Negative.    Allergic/Immunologic: Negative.    Neurological: Negative.    Hematological: Negative.    Psychiatric/Behavioral: Negative.     All other systems reviewed and are negative.     Objective   Vitals:    02/11/25 0806   BP: 122/62   Pulse: 87   Temp: 36.2 °C (97.1 °F)   SpO2: 98%     Body mass index is 34.54 kg/m².  Physical Exam  Vitals and nursing note reviewed.   Constitutional:       General: She is not in acute distress.     Appearance: Normal appearance. She is not ill-appearing.   HENT:      Head: Normocephalic and atraumatic.      Right Ear: Tympanic membrane, ear canal and external ear normal. There is no impacted cerumen.      Left Ear: Tympanic membrane, ear canal and external ear normal. There is no impacted cerumen.      Nose: Nose normal.      Mouth/Throat:      Mouth: Mucous  membranes are moist.      Pharynx: Oropharynx is clear. No oropharyngeal exudate or posterior oropharyngeal erythema.   Eyes:      General: No scleral icterus.        Right eye: No discharge.         Left eye: No discharge.      Extraocular Movements: Extraocular movements intact.      Conjunctiva/sclera: Conjunctivae normal.      Pupils: Pupils are equal, round, and reactive to light.   Neck:      Vascular: No carotid bruit.   Cardiovascular:      Rate and Rhythm: Normal rate and regular rhythm.      Pulses: Normal pulses.      Heart sounds: Normal heart sounds. No murmur heard.     No friction rub. No gallop.   Pulmonary:      Effort: Pulmonary effort is normal. No respiratory distress.      Breath sounds: Normal breath sounds.   Abdominal:      General: Abdomen is flat. Bowel sounds are normal. There is no distension.      Palpations: Abdomen is soft.      Tenderness: There is no abdominal tenderness.      Hernia: No hernia is present.   Musculoskeletal:         General: No swelling or tenderness. Normal range of motion.   Lymphadenopathy:      Cervical: No cervical adenopathy.   Skin:     General: Skin is warm and dry.      Capillary Refill: Capillary refill takes less than 2 seconds.      Coloration: Skin is not jaundiced.      Findings: No rash.   Neurological:      General: No focal deficit present.      Mental Status: She is alert and oriented to person, place, and time. Mental status is at baseline.   Psychiatric:         Mood and Affect: Mood normal.         Behavior: Behavior normal.       Diagnostic Results   Lab Results   Component Value Date    GLUCOSE 81 02/10/2025    CALCIUM 9.6 02/10/2025     02/10/2025    K 4.7 02/10/2025    CO2 25 02/10/2025     02/10/2025    BUN 11 02/10/2025    CREATININE 0.55 02/10/2025     Lab Results   Component Value Date    ALT 14 02/10/2025    AST 17 02/10/2025    ALKPHOS 64 02/10/2025    BILITOT 0.6 02/10/2025     Lab Results   Component Value Date    WBC 8.0  "02/10/2025    HGB 14.9 02/10/2025    HCT 44.9 02/10/2025    MCV 89.6 02/10/2025     02/10/2025     Lab Results   Component Value Date    CHOL 211 (H) 02/10/2025    CHOL 188 12/09/2023    CHOL 216 (H) 05/13/2022     Lab Results   Component Value Date    HDL 50 02/10/2025    HDL 42.7 12/09/2023    HDL 41 (L) 05/13/2022     Lab Results   Component Value Date    LDLCALC 122 (H) 02/10/2025    LDLCALC 99 12/09/2023    LDLCALC 132 (H) 05/13/2022     Lab Results   Component Value Date    TRIG 239 (H) 02/10/2025    TRIG 234 (H) 12/09/2023    TRIG 216 (H) 05/13/2022     No components found for: \"CHOLHDL\"  Lab Results   Component Value Date    HGBA1C 5.5 02/10/2025     Other labs not included in the list above were reviewed either before or during this encounter.    History   Past Medical History:   Diagnosis Date    Anemia     Arthritis     Breast cyst     Headache     Unspecified injury of shoulder and upper arm, unspecified arm, initial encounter 07/29/2015    Shoulder injury     Past Surgical History:   Procedure Laterality Date    APPENDECTOMY  2007    CHOLECYSTECTOMY  11/28/2016    Cholecystectomy    HYSTERECTOMY  07/29/2009    Hysterectomy    SHOULDER SURGERY  07/29/2015    Shoulder Surgery    UMBILICAL HERNIA REPAIR  07/29/2015    Umbilical Hernia Repair     Family History   Problem Relation Name Age of Onset    Other (htn) Mother Eleanor     Other (morbid obesity) Mother Eleanor     Hypertension Mother Eleanor     Cancer Father Father     Other (morbid obesity [Other]) Sister      Other (morbid obesity [Other]) Brother      Other (morbid obesity [Other]) Son      Other (htn) Son      Ovarian cancer Paternal Grandmother Aziza 40 - 49    Ovarian cancer Other family history     Thyroid disease Other family history     Other (htn) Other family history      Social History     Socioeconomic History    Marital status:      Spouse name: Not on file    Number of children: Not on file    Years of education: Not on file "    Highest education level: Not on file   Occupational History    Not on file   Tobacco Use    Smoking status: Every Day     Current packs/day: 0.50     Average packs/day: 0.5 packs/day for 15.0 years (7.5 ttl pk-yrs)     Types: Cigarettes    Smokeless tobacco: Never   Vaping Use    Vaping status: Never Used   Substance and Sexual Activity    Alcohol use: Never    Drug use: Never    Sexual activity: Yes     Partners: Choose not to disclose     Birth control/protection: Post-menopausal   Other Topics Concern    Not on file   Social History Narrative    Not on file     Social Drivers of Health     Financial Resource Strain: Not on file   Food Insecurity: Not on file   Transportation Needs: Not on file   Physical Activity: Not on file   Stress: Not on file   Social Connections: Not on file   Intimate Partner Violence: Not on file   Housing Stability: Not on file     Allergies   Allergen Reactions    Oxycodone-Acetaminophen Other     Severe N/V    Prochlorperazine Other     Tachycardia      Dihydroergotamine Hallucinations     Tachycardia      Droperidol Other and Hallucinations     tachycardia    Metoclopramide Other     tachycardia    Prasterone (Dhea) Hallucinations    Albolene Other     hallucinations    Metoclopramide Hcl Anxiety     tachycardia     Current Outpatient Medications on File Prior to Visit   Medication Sig Dispense Refill    cyanocobalamin (Vitamin B-12) 1,000 mcg/mL injection Inject 1 mL (1,000 mcg) into the muscle every 30 (thirty) days. 3 mL 3    ibuprofen 200 mg tablet Take 1-2 tablets (200-400 mg) by mouth 2 times a day as needed for mild pain (1 - 3), moderate pain (4 - 6) or headaches.      multivitamin tablet Take 1 tablet by mouth once daily. One-A-Day Women's Complete Multivitamin      tiZANidine (Zanaflex) 2 mg tablet Take 0.5-1 tablets (1-2 mg) by mouth as needed at bedtime for muscle spasms. 30 tablet 0    [DISCONTINUED] cholecalciferol (Vitamin D3) 50 mcg (2,000 unit) capsule Take 1  capsule (50 mcg) by mouth once daily.      [DISCONTINUED] amoxicillin-pot clavulanate (Augmentin) 875-125 mg tablet Take 1 tablet by mouth every 12 hours.       No current facility-administered medications on file prior to visit.     Immunization History   Administered Date(s) Administered    Hepatitis B vaccine, 19 yrs and under (RECOMBIVAX, ENGERIX) 06/14/2000    Tdap vaccine, age 7 year and older (BOOSTRIX, ADACEL) 05/03/2022     Patient's medical history was reviewed and updated either before or during this encounter.       Marco Antonio Mcnair MD

## 2025-02-12 ENCOUNTER — PHARMACY VISIT (OUTPATIENT)
Dept: PHARMACY | Facility: CLINIC | Age: 40
End: 2025-02-12
Payer: COMMERCIAL

## 2025-02-12 PROCEDURE — RXMED WILLOW AMBULATORY MEDICATION CHARGE

## 2025-02-25 ENCOUNTER — APPOINTMENT (OUTPATIENT)
Age: 40
End: 2025-02-25
Payer: COMMERCIAL

## 2025-02-25 VITALS
OXYGEN SATURATION: 96 % | WEIGHT: 220 LBS | SYSTOLIC BLOOD PRESSURE: 120 MMHG | HEART RATE: 92 BPM | RESPIRATION RATE: 16 BRPM | HEIGHT: 66 IN | BODY MASS INDEX: 35.36 KG/M2 | DIASTOLIC BLOOD PRESSURE: 60 MMHG

## 2025-02-25 DIAGNOSIS — Z82.49 FAMILY HISTORY OF CORONARY ARTERIOSCLEROSIS: Primary | ICD-10-CM

## 2025-02-25 DIAGNOSIS — R07.89 CHEST DISCOMFORT: ICD-10-CM

## 2025-02-25 DIAGNOSIS — E78.2 MIXED HYPERLIPIDEMIA: ICD-10-CM

## 2025-02-25 DIAGNOSIS — Z72.0 TOBACCO USE: ICD-10-CM

## 2025-02-25 PROCEDURE — 93000 ELECTROCARDIOGRAM COMPLETE: CPT | Performed by: INTERNAL MEDICINE

## 2025-02-25 PROCEDURE — 4004F PT TOBACCO SCREEN RCVD TLK: CPT | Performed by: INTERNAL MEDICINE

## 2025-02-25 PROCEDURE — 3008F BODY MASS INDEX DOCD: CPT | Performed by: INTERNAL MEDICINE

## 2025-02-25 PROCEDURE — 99204 OFFICE O/P NEW MOD 45 MIN: CPT | Performed by: INTERNAL MEDICINE

## 2025-02-25 RX ORDER — IBUPROFEN 200 MG
1 TABLET ORAL DAILY
Qty: 14 PATCH | Refills: 0 | Status: SHIPPED | OUTPATIENT
Start: 2025-02-25 | End: 2025-03-11

## 2025-02-25 ASSESSMENT — LIFESTYLE VARIABLES
HAS A RELATIVE, FRIEND, DOCTOR, OR ANOTHER HEALTH PROFESSIONAL EXPRESSED CONCERN ABOUT YOUR DRINKING OR SUGGESTED YOU CUT DOWN: NO
SKIP TO QUESTIONS 9-10: 1
HOW OFTEN DO YOU HAVE SIX OR MORE DRINKS ON ONE OCCASION: NEVER
AUDIT-C TOTAL SCORE: 0
HOW MANY STANDARD DRINKS CONTAINING ALCOHOL DO YOU HAVE ON A TYPICAL DAY: PATIENT DOES NOT DRINK
HAVE YOU OR SOMEONE ELSE BEEN INJURED AS A RESULT OF YOUR DRINKING: NO
HOW OFTEN DO YOU HAVE A DRINK CONTAINING ALCOHOL: NEVER
AUDIT TOTAL SCORE: 0

## 2025-02-25 ASSESSMENT — ENCOUNTER SYMPTOMS
DEPRESSION: 0
OCCASIONAL FEELINGS OF UNSTEADINESS: 0
LOSS OF SENSATION IN FEET: 0

## 2025-02-25 ASSESSMENT — PAIN SCALES - GENERAL: PAINLEVEL_OUTOF10: 0-NO PAIN

## 2025-02-25 NOTE — ASSESSMENT & PLAN NOTE
Her symptoms sound more typical for costochondritis however she does have a strong family history of premature coronary disease.

## 2025-02-25 NOTE — PROGRESS NOTES
Subjective      Chief Complaint   Patient presents with    Establish Care     MrsHumphrey Dickerson is present to establish relationship with Dr. Wagoner  for Cardiac Eval and Treat after referral from Dr. Mcnair          Referred by her primary care physician for chest pain evaluation.  She has no history of previous myocardial infarction, heart failure, valvular heart disease, syncope or pericarditis or sustained arrhythmias.  She and her  just built a home recently and she has been moving a lot of boxes in the basement and she is organizing and lifting heavy boxes started the original sharp pain in her left upper parasternal region rating up into her left shoulder and it only lasted a few seconds but took her breath away and intermittently since then when she is lifting boxes she is having similar types of pain.  She is not describing chest pain with walking or other types of exercise.    She does have a strong family history of premature coronary disease in her mother and her father  of pharyngeal cancer the age of 38.  There was family history of coronary disease on his side of the family as well.    Other activities are not provoking any chest or shoulder pain episodes.    EKG normal sinus rhythm without evidence of ischemia.         Review of Systems   All other systems reviewed and are negative.       Objective   Physical Exam  Constitutional:       Appearance: Normal appearance.   HENT:      Head: Normocephalic and atraumatic.   Eyes:      Pupils: Pupils are equal, round, and reactive to light.   Cardiovascular:      Rate and Rhythm: Normal rate and regular rhythm.      Pulses: Normal pulses.      Heart sounds: Normal heart sounds.   Pulmonary:      Effort: Pulmonary effort is normal.      Breath sounds: Normal breath sounds.   Abdominal:      General: Abdomen is flat. Bowel sounds are normal.      Palpations: Abdomen is soft.   Musculoskeletal:         General: Normal range of motion.       Cervical back: Normal range of motion.   Skin:     General: Skin is warm and dry.   Neurological:      General: No focal deficit present.   Psychiatric:         Mood and Affect: Mood normal.         Judgment: Judgment normal.          Lab Review:   Not applicable    Chest discomfort  Her symptoms sound more typical for costochondritis however she does have a strong family history of premature coronary disease.    Family history of coronary arteriosclerosis  Both her mother and some of her mother and maternal relatives had premature coronary disease.  I suggested nuclear stress testing to exclude coronary ischemia given the strong family history of premature coronary artery disease.  She is agreeable with this strategy.    If she shows evidence of reversible ischemia by her stress test then cardiac catheterization will be an appropriate next step.

## 2025-02-25 NOTE — ASSESSMENT & PLAN NOTE
Both her mother and some of her mother and maternal relatives had premature coronary disease.  I suggested nuclear stress testing to exclude coronary ischemia given the strong family history of premature coronary artery disease.  She is agreeable with this strategy.    If she shows evidence of reversible ischemia by her stress test then cardiac catheterization will be an appropriate next step.

## 2025-03-05 ENCOUNTER — APPOINTMENT (OUTPATIENT)
Dept: PAIN MEDICINE | Facility: CLINIC | Age: 40
End: 2025-03-05
Payer: COMMERCIAL

## 2025-03-13 ENCOUNTER — HOSPITAL ENCOUNTER (OUTPATIENT)
Dept: RADIOLOGY | Facility: HOSPITAL | Age: 40
Discharge: HOME | End: 2025-03-13
Payer: COMMERCIAL

## 2025-03-13 ENCOUNTER — HOSPITAL ENCOUNTER (OUTPATIENT)
Dept: CARDIOLOGY | Facility: HOSPITAL | Age: 40
Discharge: HOME | End: 2025-03-13
Payer: COMMERCIAL

## 2025-03-13 DIAGNOSIS — E78.5 HYPERLIPIDEMIA, UNSPECIFIED: ICD-10-CM

## 2025-03-13 DIAGNOSIS — R07.89 CHEST DISCOMFORT: ICD-10-CM

## 2025-03-13 DIAGNOSIS — E78.2 MIXED HYPERLIPIDEMIA: ICD-10-CM

## 2025-03-13 DIAGNOSIS — Z82.49 FAMILY HISTORY OF CORONARY ARTERIOSCLEROSIS: ICD-10-CM

## 2025-03-13 PROCEDURE — 78452 HT MUSCLE IMAGE SPECT MULT: CPT

## 2025-03-13 PROCEDURE — 93018 CV STRESS TEST I&R ONLY: CPT | Performed by: INTERNAL MEDICINE

## 2025-03-13 PROCEDURE — 93017 CV STRESS TEST TRACING ONLY: CPT

## 2025-03-13 PROCEDURE — 3430000001 HC RX 343 DIAGNOSTIC RADIOPHARMACEUTICALS: Performed by: INTERNAL MEDICINE

## 2025-03-13 PROCEDURE — A9502 TC99M TETROFOSMIN: HCPCS | Performed by: INTERNAL MEDICINE

## 2025-03-13 PROCEDURE — 93016 CV STRESS TEST SUPVJ ONLY: CPT | Performed by: INTERNAL MEDICINE

## 2025-03-13 RX ADMIN — TETROFOSMIN 34.2 MILLICURIE: 0.23 INJECTION, POWDER, LYOPHILIZED, FOR SOLUTION INTRAVENOUS at 07:56

## 2025-03-13 RX ADMIN — TETROFOSMIN 10.7 MILLICURIE: 0.23 INJECTION, POWDER, LYOPHILIZED, FOR SOLUTION INTRAVENOUS at 06:45

## 2025-04-03 ENCOUNTER — OFFICE VISIT (OUTPATIENT)
Dept: PAIN MEDICINE | Facility: CLINIC | Age: 40
End: 2025-04-03
Payer: COMMERCIAL

## 2025-04-03 VITALS
DIASTOLIC BLOOD PRESSURE: 72 MMHG | HEART RATE: 99 BPM | OXYGEN SATURATION: 97 % | WEIGHT: 220 LBS | HEIGHT: 66 IN | BODY MASS INDEX: 35.36 KG/M2 | SYSTOLIC BLOOD PRESSURE: 110 MMHG | RESPIRATION RATE: 16 BRPM

## 2025-04-03 DIAGNOSIS — M75.101 ROTATOR CUFF SYNDROME OF RIGHT SHOULDER: Primary | ICD-10-CM

## 2025-04-03 DIAGNOSIS — Z79.899 ENCOUNTER FOR LONG-TERM (CURRENT) USE OF HIGH-RISK MEDICATION: ICD-10-CM

## 2025-04-03 PROCEDURE — 99214 OFFICE O/P EST MOD 30 MIN: CPT | Performed by: ANESTHESIOLOGY

## 2025-04-03 PROCEDURE — 3008F BODY MASS INDEX DOCD: CPT | Performed by: ANESTHESIOLOGY

## 2025-04-03 PROCEDURE — 4004F PT TOBACCO SCREEN RCVD TLK: CPT | Performed by: ANESTHESIOLOGY

## 2025-04-03 RX ORDER — HYDROCODONE BITARTRATE AND ACETAMINOPHEN 5; 325 MG/1; MG/1
1 TABLET ORAL 2 TIMES DAILY PRN
Qty: 60 TABLET | Refills: 0 | Status: SHIPPED | OUTPATIENT
Start: 2025-04-03 | End: 2025-05-04

## 2025-04-03 ASSESSMENT — ENCOUNTER SYMPTOMS
RESPIRATORY NEGATIVE: 1
CONSTITUTIONAL NEGATIVE: 1
MYALGIAS: 1
GASTROINTESTINAL NEGATIVE: 1
ALLERGIC/IMMUNOLOGIC NEGATIVE: 1
EYES NEGATIVE: 1
ENDOCRINE NEGATIVE: 1
ARTHRALGIAS: 1
NEUROLOGICAL NEGATIVE: 1
CARDIOVASCULAR NEGATIVE: 1
HEMATOLOGIC/LYMPHATIC NEGATIVE: 1
PSYCHIATRIC NEGATIVE: 1

## 2025-04-03 ASSESSMENT — PAIN - FUNCTIONAL ASSESSMENT: PAIN_FUNCTIONAL_ASSESSMENT: 0-10

## 2025-04-03 ASSESSMENT — PAIN SCALES - GENERAL
PAINLEVEL_OUTOF10: 5
PAINLEVEL_OUTOF10: 5 - MODERATE PAIN

## 2025-04-03 ASSESSMENT — LIFESTYLE VARIABLES: TOTAL SCORE: 3

## 2025-04-03 ASSESSMENT — PAIN DESCRIPTION - DESCRIPTORS: DESCRIPTORS: STABBING

## 2025-04-03 NOTE — PROGRESS NOTES
Subjective   Patient ID: Joi Dickerson is a 39 y.o. female who presents for Shoulder Pain.  Shoulder Pain     Patient here today for follow-up on her right shoulder.  She reports he finally closed her Worker's Comp. claim.  She is now free to get a surgical consultation which she is excited for.  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  She uses her medications on a as needed basis when the pain is extreme.  She has been doing all her physical therapy exercises from the last approved physical therapy under her Worker's Comp. claim.  She does exercises on a daily basis so she does not lose range of motion of her shoulder or strength.  She has significant pain in the right shoulder with increased activity with lifting and with range of motion.  Medication regimen continues to be helpful on a as needed basis.  She reports no adverse side effects.  She uses her medications very sparingly.    Pain Score:   5       Review of Systems   Constitutional: Negative.    HENT: Negative.     Eyes: Negative.    Respiratory: Negative.     Cardiovascular: Negative.    Gastrointestinal: Negative.    Endocrine: Negative.    Genitourinary: Negative.    Musculoskeletal:  Positive for arthralgias and myalgias.   Skin: Negative.    Allergic/Immunologic: Negative.    Neurological: Negative.    Hematological: Negative.    Psychiatric/Behavioral: Negative.         Objective   Physical Exam  Vitals and nursing note reviewed.   Constitutional:       Appearance: Normal appearance.   HENT:      Head: Normocephalic and atraumatic.      Right Ear: Ear canal and external ear normal.      Left Ear: Ear canal and external ear normal.      Nose: Nose normal.      Mouth/Throat:      Mouth: Mucous membranes are moist.      Pharynx: Oropharynx is clear.   Eyes:      Conjunctiva/sclera: Conjunctivae normal.      Pupils: Pupils are equal, round, and reactive to light.   Cardiovascular:      Rate and Rhythm: Normal rate.   Pulmonary:      Effort:  Pulmonary effort is normal. No respiratory distress.   Musculoskeletal:      Right shoulder: Tenderness present. Decreased range of motion. Decreased strength (4/5).        Arms:       Cervical back: Normal range of motion and neck supple.      Lumbar back: Tenderness present. Normal range of motion.      Comments: Lift Off Test (+) Right   Pain with adduction and external rotation     Skin:     General: Skin is warm and dry.   Neurological:      Mental Status: She is alert.   Psychiatric:         Mood and Affect: Mood normal.         Thought Content: Thought content normal.         Assessment/Plan   Problem List Items Addressed This Visit    None  Visit Diagnoses         Codes    Rotator cuff syndrome of right shoulder    -  Primary M75.101    Relevant Medications    HYDROcodone-acetaminophen (Norco) 5-325 mg tablet    Other Relevant Orders    MR shoulder right wo IV contrast    Encounter for long-term (current) use of high-risk medication     Z79.899    Relevant Orders    Opiate/Opioid/Benzo Prescription Compliance             I nice discussion with the patient today our plan will be as follows.    Radiology: Patient have noncontrasted MRI of the right shoulder.    Physically: Patient has been through extensive physical therapy over the last 2 years.  She does a daily home phase 2 PT exercise regimen for the last 6 months.    Psychologically: No issues at this time.    Medication: I will refill the patient's opioids today for 2 month.  The patient continues to see benefit and improvement in their quality of life and ability to maintain ADLs.  Patient educated about the risks of taking opioids and operating a motor vehicle.  Patient reports no adverse side effects to current medication regimen.  Current regimen does allow patient to maintain ADLs.  Patient reports no new neurologic symptoms, new pain areas, or exacerbation in pain today.  Patient reports they are happy with current treatment care path.    OAS was  reviewed and was consistent with the history.    Patient has been educated on the risks, benefits, and alternatives of controlled substances as well as the proper way to store these medications.  The patient and I discussed the nature of this medication and its side effects.  We discussed tolerance, physical dependence, psychological dependence, addiction and opioid-induced hyperalgesia.  We discussed the potential need to wean from this medication.  We discussed the availability of programs that can help with this process if necessary.  We discussed safety issues related to opioids including safe storage.  We discussed the fact that the patient should not drive an automobile or operate heavy machinery while taking this medication.  A prescription for naloxone was offered to the patient.  The patient will be re-evaluated for the need to continue opioid therapy in 60-90 days.  All previous urine drug screens and saliva drug screens were reviewed today and are compliant with the patient's prescriptive history.      Duration: Greater than 1 year.    Intervention: No intervention at this time patient to have consultation with orthopedic surgeon.          Please note that this report has been produced using speech recognition software. It may contain errors related to grammar, punctuation or spelling. Electronically signed, but not reviewed.       Alber Aguero MD 04/03/25 2:21 PM

## 2025-04-03 NOTE — PROGRESS NOTES
MEDICATION NAME: norco  STRENGTH: 5/325mg  LAST FILL DATE: 24  DATE LAST TAKEN: 25  QUANTITY FILLED: 60  QUANTITY REMAININ  COUNT COMPLETED BY: JHON FERNANDEZ RN and RIP JARRELL      UDS LAST COMPLETED: 2024  CONTROLLED SUBSTANCES AGREEMENT LAST SIGNED: 2024  ORT LAST COMPLETED:2024  Modified [employer] disability form filled out annually.

## 2025-04-04 ENCOUNTER — PHARMACY VISIT (OUTPATIENT)
Dept: PHARMACY | Facility: CLINIC | Age: 40
End: 2025-04-04

## 2025-04-04 PROCEDURE — RXMED WILLOW AMBULATORY MEDICATION CHARGE

## 2025-04-06 LAB
1OH-MIDAZOLAM UR-MCNC: NEGATIVE NG/ML
7AMINOCLONAZEPAM UR-MCNC: NEGATIVE NG/ML
A-OH ALPRAZ UR-MCNC: NEGATIVE NG/ML
A-OH-TRIAZOLAM UR-MCNC: NEGATIVE NG/ML
AMPHETAMINES UR QL: NEGATIVE NG/ML
BARBITURATES UR QL: NEGATIVE NG/ML
BZE UR QL: NEGATIVE NG/ML
CODEINE UR-MCNC: NEGATIVE NG/ML
CREAT UR-MCNC: 57.6 MG/DL
DRUG SCREEN COMMENT UR-IMP: NORMAL
EDDP UR-MCNC: NEGATIVE NG/ML
FENTANYL UR-MCNC: NEGATIVE NG/ML
HYDROCODONE UR-MCNC: NEGATIVE NG/ML
HYDROMORPHONE UR-MCNC: NEGATIVE NG/ML
LORAZEPAM UR-MCNC: NEGATIVE NG/ML
METHADONE UR-MCNC: NEGATIVE NG/ML
MORPHINE UR-MCNC: NEGATIVE NG/ML
NORDIAZEPAM UR-MCNC: NEGATIVE NG/ML
NORFENTANYL UR-MCNC: NEGATIVE NG/ML
NORHYDROCODONE UR CFM-MCNC: NEGATIVE NG/ML
NOROXYCODONE UR CFM-MCNC: NEGATIVE NG/ML
NORTRAMADOL UR-MCNC: NEGATIVE NG/ML
OH-ETHYLFLURAZ UR-MCNC: NEGATIVE NG/ML
OXAZEPAM UR-MCNC: NEGATIVE NG/ML
OXIDANTS UR QL: NEGATIVE MCG/ML
OXYCODONE UR CFM-MCNC: NEGATIVE NG/ML
OXYMORPHONE UR CFM-MCNC: NEGATIVE NG/ML
PCP UR QL: NEGATIVE NG/ML
PH UR: 7.2 [PH] (ref 4.5–9)
QUEST 6 ACETYLMORPHINE: NEGATIVE NG/ML
QUEST NOTES AND COMMENTS: NORMAL
QUEST ZOLPIDEM: NEGATIVE NG/ML
TEMAZEPAM UR-MCNC: NEGATIVE NG/ML
THC UR QL: NEGATIVE NG/ML
TRAMADOL UR-MCNC: NEGATIVE NG/ML
ZOLPIDEM PHENYL-4-CARB UR CFM-MCNC: NEGATIVE NG/ML

## 2025-04-16 ENCOUNTER — APPOINTMENT (OUTPATIENT)
Dept: PAIN MEDICINE | Facility: CLINIC | Age: 40
End: 2025-04-16
Payer: COMMERCIAL

## 2025-04-21 ENCOUNTER — HOSPITAL ENCOUNTER (OUTPATIENT)
Dept: RADIOLOGY | Facility: HOSPITAL | Age: 40
Discharge: HOME | End: 2025-04-21
Payer: COMMERCIAL

## 2025-04-21 DIAGNOSIS — M75.101 ROTATOR CUFF SYNDROME OF RIGHT SHOULDER: ICD-10-CM

## 2025-04-21 PROCEDURE — 73221 MRI JOINT UPR EXTREM W/O DYE: CPT | Mod: RT

## 2025-04-21 PROCEDURE — 73221 MRI JOINT UPR EXTREM W/O DYE: CPT | Mod: RIGHT SIDE | Performed by: RADIOLOGY

## 2025-04-29 ENCOUNTER — APPOINTMENT (OUTPATIENT)
Age: 40
End: 2025-04-29
Payer: COMMERCIAL

## 2025-05-11 DIAGNOSIS — E55.9 VITAMIN D DEFICIENCY: ICD-10-CM

## 2025-05-17 PROCEDURE — RXMED WILLOW AMBULATORY MEDICATION CHARGE

## 2025-05-20 ENCOUNTER — PHARMACY VISIT (OUTPATIENT)
Dept: PHARMACY | Facility: CLINIC | Age: 40
End: 2025-05-20
Payer: COMMERCIAL

## 2025-05-20 DIAGNOSIS — S46.011A TRAUMATIC INCOMPLETE TEAR OF RIGHT ROTATOR CUFF, INITIAL ENCOUNTER: Primary | ICD-10-CM

## 2025-05-20 PROCEDURE — RXMED WILLOW AMBULATORY MEDICATION CHARGE

## 2025-05-20 RX ORDER — HYDROCODONE BITARTRATE AND ACETAMINOPHEN 5; 325 MG/1; MG/1
1 TABLET ORAL 2 TIMES DAILY PRN
Qty: 60 TABLET | Refills: 0 | Status: SHIPPED | OUTPATIENT
Start: 2025-05-20 | End: 2025-06-19

## 2025-06-26 ENCOUNTER — TELEPHONE (OUTPATIENT)
Facility: CLINIC | Age: 40
End: 2025-06-26
Payer: COMMERCIAL

## 2025-07-01 ENCOUNTER — PHARMACY VISIT (OUTPATIENT)
Dept: PHARMACY | Facility: CLINIC | Age: 40
End: 2025-07-01
Payer: COMMERCIAL

## 2025-07-01 ENCOUNTER — OFFICE VISIT (OUTPATIENT)
Dept: PAIN MEDICINE | Facility: CLINIC | Age: 40
End: 2025-07-01
Payer: COMMERCIAL

## 2025-07-01 VITALS
SYSTOLIC BLOOD PRESSURE: 116 MMHG | DIASTOLIC BLOOD PRESSURE: 78 MMHG | HEIGHT: 66 IN | RESPIRATION RATE: 16 BRPM | WEIGHT: 216 LBS | BODY MASS INDEX: 34.72 KG/M2

## 2025-07-01 DIAGNOSIS — G89.29 CHRONIC RIGHT SHOULDER PAIN: ICD-10-CM

## 2025-07-01 DIAGNOSIS — M25.511 CHRONIC RIGHT SHOULDER PAIN: ICD-10-CM

## 2025-07-01 DIAGNOSIS — M15.0 PRIMARY OSTEOARTHRITIS INVOLVING MULTIPLE JOINTS: ICD-10-CM

## 2025-07-01 DIAGNOSIS — M54.50 ACUTE RIGHT-SIDED LOW BACK PAIN WITHOUT SCIATICA: Primary | ICD-10-CM

## 2025-07-01 DIAGNOSIS — G43.909 MIGRAINE SYNDROME: ICD-10-CM

## 2025-07-01 PROCEDURE — 3008F BODY MASS INDEX DOCD: CPT | Performed by: ANESTHESIOLOGY

## 2025-07-01 PROCEDURE — RXMED WILLOW AMBULATORY MEDICATION CHARGE

## 2025-07-01 PROCEDURE — 2500000004 HC RX 250 GENERAL PHARMACY W/ HCPCS (ALT 636 FOR OP/ED): Mod: JZ | Performed by: ANESTHESIOLOGY

## 2025-07-01 PROCEDURE — 99214 OFFICE O/P EST MOD 30 MIN: CPT | Performed by: ANESTHESIOLOGY

## 2025-07-01 PROCEDURE — 96372 THER/PROPH/DIAG INJ SC/IM: CPT | Performed by: ANESTHESIOLOGY

## 2025-07-01 PROCEDURE — 4004F PT TOBACCO SCREEN RCVD TLK: CPT | Performed by: ANESTHESIOLOGY

## 2025-07-01 RX ORDER — HYDROCODONE BITARTRATE AND ACETAMINOPHEN 5; 325 MG/1; MG/1
1 TABLET ORAL EVERY 12 HOURS PRN
Qty: 60 TABLET | Refills: 0 | Status: SHIPPED | OUTPATIENT
Start: 2025-07-01 | End: 2025-07-31

## 2025-07-01 RX ORDER — KETOROLAC TROMETHAMINE 30 MG/ML
30 INJECTION, SOLUTION INTRAMUSCULAR; INTRAVENOUS ONCE
Status: COMPLETED | OUTPATIENT
Start: 2025-07-01 | End: 2025-07-01

## 2025-07-01 RX ORDER — TIZANIDINE 2 MG/1
1-2 TABLET ORAL NIGHTLY PRN
Qty: 30 TABLET | Refills: 0 | Status: SHIPPED | OUTPATIENT
Start: 2025-07-01 | End: 2025-07-21

## 2025-07-01 RX ADMIN — KETOROLAC TROMETHAMINE 30 MG: 30 INJECTION, SOLUTION INTRAMUSCULAR; INTRAVENOUS at 13:46

## 2025-07-01 ASSESSMENT — ENCOUNTER SYMPTOMS
ENDOCRINE NEGATIVE: 1
CONSTITUTIONAL NEGATIVE: 1
EYES NEGATIVE: 1
PSYCHIATRIC NEGATIVE: 1
MYALGIAS: 1
ARTHRALGIAS: 1
NEUROLOGICAL NEGATIVE: 1
ALLERGIC/IMMUNOLOGIC NEGATIVE: 1
GASTROINTESTINAL NEGATIVE: 1
RESPIRATORY NEGATIVE: 1
HEMATOLOGIC/LYMPHATIC NEGATIVE: 1
CARDIOVASCULAR NEGATIVE: 1

## 2025-07-01 ASSESSMENT — PAIN SCALES - GENERAL
PAINLEVEL_OUTOF10: 4
PAINLEVEL_OUTOF10: 4
PAINLEVEL_OUTOF10: 8

## 2025-07-01 ASSESSMENT — PAIN DESCRIPTION - DESCRIPTORS: DESCRIPTORS: ACHING

## 2025-07-01 ASSESSMENT — PAIN - FUNCTIONAL ASSESSMENT
PAIN_FUNCTIONAL_ASSESSMENT: 0-10
PAIN_FUNCTIONAL_ASSESSMENT: 0-10

## 2025-07-01 NOTE — PROGRESS NOTES
MEDICATION NAME: NORCO  STRENGTH: 5/325MG  LAST FILL DATE: 2025  DATE LAST TAKEN: 25  QUANTITY FILLED: 60  QUANTITY REMAININ  COUNT COMPLETED BY: tere quinones rn & kobi Craig rn      UDS COMPLETED2025  CONTROLLED SUBSTANCES AGREEMENT SIGNED2024  ORT COMPLETED2025  Modified [employer] disability form filled out annually.

## 2025-07-01 NOTE — PROGRESS NOTES
Subjective   Patient ID: Joi Dickerson is a 39 y.o. female who presents for Shoulder Pain.  Shoulder Pain     Patient here today for follow up today.   She hurt her back 4 days ago when she was swimming.  She got up after her exercsies and felt a pop on the right side of her back.  Her pain is just staying on the right side of her back.  She reports no radiation of the pain down to her lower extremity.  She reports no numbness, tingling, weakness in her right lower extremity.  She does find that full extension will cause pain.  She has a tightness and spasm in the right side of her back.  He has been continually trying to move.  She will rest.  She continues to do her pool exercises.  She gets up and walks around her house.  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  She reports no falls or weakness.    She did get a new right shoulder MRI and does have follow-up with Dr. Benjamin to consider the need for any additional surgical intervention within the next 30 days.    Pain Score:   4       Review of Systems   Constitutional: Negative.    HENT: Negative.     Eyes: Negative.    Respiratory: Negative.     Cardiovascular: Negative.    Gastrointestinal: Negative.    Endocrine: Negative.    Genitourinary: Negative.    Musculoskeletal:  Positive for arthralgias and myalgias.   Skin: Negative.    Allergic/Immunologic: Negative.    Neurological: Negative.    Hematological: Negative.    Psychiatric/Behavioral: Negative.         Objective   Physical Exam  Vitals and nursing note reviewed.   Constitutional:       Appearance: Normal appearance.   HENT:      Head: Normocephalic and atraumatic.      Right Ear: Ear canal and external ear normal.      Left Ear: Ear canal and external ear normal.      Nose: Nose normal.      Mouth/Throat:      Mouth: Mucous membranes are moist.      Pharynx: Oropharynx is clear.   Eyes:      Conjunctiva/sclera: Conjunctivae normal.       Pupils: Pupils are equal, round, and reactive to light.   Cardiovascular:      Rate and Rhythm: Normal rate.   Pulmonary:      Effort: Pulmonary effort is normal. No respiratory distress.   Musculoskeletal:      Right shoulder: Tenderness present. Decreased range of motion. Decreased strength (4/5).        Arms:       Cervical back: Normal range of motion and neck supple.      Thoracic back: Normal.      Lumbar back: Spasms and tenderness present. Normal range of motion. Negative right straight leg raise test and negative left straight leg raise test.        Back:       Comments: Lift Off Test (+) Right   Pain with adduction and external rotation     Skin:     General: Skin is warm and dry.   Neurological:      Mental Status: She is alert and oriented to person, place, and time.      Sensory: Sensation is intact.      Motor: Motor function is intact.      Coordination: Coordination is intact.      Gait: Gait is intact.   Psychiatric:         Mood and Affect: Mood normal.         Thought Content: Thought content normal.         Assessment/Plan   Problem List Items Addressed This Visit           ICD-10-CM    OA (osteoarthritis) M19.90    Relevant Medications    tiZANidine (Zanaflex) 2 mg tablet    Migraine syndrome G43.909    Relevant Medications    tiZANidine (Zanaflex) 2 mg tablet    Shoulder pain M25.519    Relevant Medications    tiZANidine (Zanaflex) 2 mg tablet     Other Visit Diagnoses         Codes      Acute right-sided low back pain without sciatica    -  Primary M54.50    Relevant Medications    ketorolac (Toradol) injection 30 mg (Start on 7/1/2025  2:00 PM)    HYDROcodone-acetaminophen (Norco) 5-325 mg tablet               I nice discussion with the patient today our plan will be as follows.    Radiology: All available imaging was reviewed today.    Physically: Patient should continue with her swimming exercises.  I did instruct her into some lumbar exercises she can do after she gets out of the shower.   I also gave her the information to contact Dr. Abran Mak.    Psychologically: No issues at this time.    Medication: I will refill the patient's opioids today for 2 month.  The patient continues to see benefit and improvement in their quality of life and ability to maintain ADLs.  Patient educated about the risks of taking opioids and operating a motor vehicle.  Patient reports no adverse side effects to current medication regimen.  Current regimen does allow patient to maintain ADLs.  Patient reports no new neurologic symptoms, new pain areas, or exacerbation in pain today.  Patient reports they are happy with current treatment care path.    [place] was reviewed and was consistent with the history.    Patient has been educated on the risks, benefits, and alternatives of controlled substances as well as the proper way to store these medications.  The patient and I discussed the nature of this medication and its side effects.  We discussed tolerance, physical dependence, psychological dependence, addiction and opioid-induced hyperalgesia.  We discussed the potential need to wean from this medication.  We discussed the availability of programs that can help with this process if necessary.  We discussed safety issues related to opioids including safe storage.  We discussed the fact that the patient should not drive an automobile or operate heavy machinery while taking this medication.  A prescription for naloxone was offered to the patient.  The patient will be re-evaluated for the need to continue opioid therapy in 60-90 days.  Patient received Toradol 30 mg IM x 1 today.  All previous urine drug screens and saliva drug screens were reviewed today and are compliant with the patient's prescriptive history.    If the patient does not get better over the weekend I told her to reach out we can provide her Medrol Dosepak.    Duration: Greater than 1 year.    Intervention: We hold off on any intervention at this time.   Patient may benefit from trigger point injections.          Please note that this report has been produced using speech recognition software. It may contain errors related to grammar, punctuation or spelling. Electronically signed, but not reviewed.     Alber Aguero MD 07/01/25 1:32 PM

## 2025-07-08 ENCOUNTER — APPOINTMENT (OUTPATIENT)
Age: 40
End: 2025-07-08
Payer: COMMERCIAL

## 2025-07-20 LAB — 25(OH)D3+25(OH)D2 SERPL-MCNC: 42 NG/ML (ref 30–100)

## 2025-07-21 DIAGNOSIS — M25.511 CHRONIC RIGHT SHOULDER PAIN: ICD-10-CM

## 2025-07-21 DIAGNOSIS — M15.0 PRIMARY OSTEOARTHRITIS INVOLVING MULTIPLE JOINTS: ICD-10-CM

## 2025-07-21 DIAGNOSIS — G43.909 MIGRAINE SYNDROME: ICD-10-CM

## 2025-07-21 DIAGNOSIS — G89.29 CHRONIC RIGHT SHOULDER PAIN: ICD-10-CM

## 2025-07-21 PROCEDURE — RXMED WILLOW AMBULATORY MEDICATION CHARGE

## 2025-07-22 ENCOUNTER — PHARMACY VISIT (OUTPATIENT)
Dept: PHARMACY | Facility: CLINIC | Age: 40
End: 2025-07-22
Payer: COMMERCIAL

## 2025-07-22 PROCEDURE — RXMED WILLOW AMBULATORY MEDICATION CHARGE

## 2025-07-22 RX ORDER — TIZANIDINE 2 MG/1
1-2 TABLET ORAL NIGHTLY PRN
Qty: 30 TABLET | Refills: 0 | Status: SHIPPED | OUTPATIENT
Start: 2025-07-22 | End: 2025-08-21

## 2025-07-31 ENCOUNTER — APPOINTMENT (OUTPATIENT)
Facility: CLINIC | Age: 40
End: 2025-07-31
Payer: COMMERCIAL

## 2025-08-18 DIAGNOSIS — G89.29 CHRONIC RIGHT SHOULDER PAIN: Primary | ICD-10-CM

## 2025-08-18 DIAGNOSIS — M25.511 CHRONIC RIGHT SHOULDER PAIN: Primary | ICD-10-CM

## 2025-08-18 PROCEDURE — RXMED WILLOW AMBULATORY MEDICATION CHARGE

## 2025-08-18 RX ORDER — HYDROCODONE BITARTRATE AND ACETAMINOPHEN 5; 325 MG/1; MG/1
1 TABLET ORAL EVERY 12 HOURS PRN
COMMUNITY
End: 2025-08-18 | Stop reason: SDUPTHER

## 2025-08-18 RX ORDER — HYDROCODONE BITARTRATE AND ACETAMINOPHEN 5; 325 MG/1; MG/1
1 TABLET ORAL EVERY 12 HOURS PRN
Qty: 20 TABLET | Refills: 0 | Status: SHIPPED | OUTPATIENT
Start: 2025-08-18

## 2025-08-19 ENCOUNTER — PHARMACY VISIT (OUTPATIENT)
Dept: PHARMACY | Facility: CLINIC | Age: 40
End: 2025-08-19
Payer: COMMERCIAL

## 2025-08-19 DIAGNOSIS — M25.511 CHRONIC RIGHT SHOULDER PAIN: ICD-10-CM

## 2025-08-19 DIAGNOSIS — G89.29 CHRONIC RIGHT SHOULDER PAIN: ICD-10-CM

## 2025-08-19 DIAGNOSIS — M15.0 PRIMARY OSTEOARTHRITIS INVOLVING MULTIPLE JOINTS: ICD-10-CM

## 2025-08-19 DIAGNOSIS — G43.909 MIGRAINE SYNDROME: ICD-10-CM

## 2025-08-19 PROCEDURE — RXMED WILLOW AMBULATORY MEDICATION CHARGE

## 2025-08-19 RX ORDER — TIZANIDINE 2 MG/1
1-2 TABLET ORAL NIGHTLY PRN
Qty: 30 TABLET | Refills: 0 | Status: SHIPPED | OUTPATIENT
Start: 2025-08-19 | End: 2025-09-18

## 2025-08-22 ENCOUNTER — TELEPHONE (OUTPATIENT)
Facility: CLINIC | Age: 40
End: 2025-08-22
Payer: COMMERCIAL

## 2025-09-02 ENCOUNTER — OFFICE VISIT (OUTPATIENT)
Dept: PAIN MEDICINE | Facility: CLINIC | Age: 40
End: 2025-09-02
Payer: COMMERCIAL

## 2025-09-02 VITALS
HEIGHT: 66 IN | BODY MASS INDEX: 34.72 KG/M2 | SYSTOLIC BLOOD PRESSURE: 120 MMHG | HEART RATE: 87 BPM | WEIGHT: 216 LBS | OXYGEN SATURATION: 97 % | DIASTOLIC BLOOD PRESSURE: 70 MMHG | RESPIRATION RATE: 16 BRPM

## 2025-09-02 DIAGNOSIS — Z79.899 ENCOUNTER FOR LONG-TERM (CURRENT) USE OF HIGH-RISK MEDICATION: ICD-10-CM

## 2025-09-02 DIAGNOSIS — G89.29 CHRONIC RIGHT SHOULDER PAIN: ICD-10-CM

## 2025-09-02 DIAGNOSIS — S46.011A TRAUMATIC INCOMPLETE TEAR OF RIGHT ROTATOR CUFF, INITIAL ENCOUNTER: Primary | ICD-10-CM

## 2025-09-02 DIAGNOSIS — M25.511 CHRONIC RIGHT SHOULDER PAIN: ICD-10-CM

## 2025-09-02 PROCEDURE — 3008F BODY MASS INDEX DOCD: CPT | Performed by: ANESTHESIOLOGY

## 2025-09-02 PROCEDURE — 99213 OFFICE O/P EST LOW 20 MIN: CPT

## 2025-09-02 PROCEDURE — 4004F PT TOBACCO SCREEN RCVD TLK: CPT | Performed by: ANESTHESIOLOGY

## 2025-09-02 PROCEDURE — 99213 OFFICE O/P EST LOW 20 MIN: CPT | Performed by: ANESTHESIOLOGY

## 2025-09-02 ASSESSMENT — ENCOUNTER SYMPTOMS
CARDIOVASCULAR NEGATIVE: 1
ARTHRALGIAS: 1
CONSTITUTIONAL NEGATIVE: 1
PSYCHIATRIC NEGATIVE: 1
ENDOCRINE NEGATIVE: 1
MYALGIAS: 1
EYES NEGATIVE: 1
ALLERGIC/IMMUNOLOGIC NEGATIVE: 1
RESPIRATORY NEGATIVE: 1
HEMATOLOGIC/LYMPHATIC NEGATIVE: 1
NEUROLOGICAL NEGATIVE: 1
GASTROINTESTINAL NEGATIVE: 1

## 2025-09-02 ASSESSMENT — PAIN - FUNCTIONAL ASSESSMENT: PAIN_FUNCTIONAL_ASSESSMENT: 0-10

## 2025-09-02 ASSESSMENT — PAIN SCALES - GENERAL
PAINLEVEL_OUTOF10: 4
PAINLEVEL_OUTOF10: 4

## 2025-09-02 ASSESSMENT — PAIN DESCRIPTION - DESCRIPTORS: DESCRIPTORS: ACHING

## 2025-09-07 LAB
1OH-MIDAZOLAM UR-MCNC: NEGATIVE NG/ML
7AMINOCLONAZEPAM UR-MCNC: NEGATIVE NG/ML
A-OH ALPRAZ UR-MCNC: NEGATIVE NG/ML
A-OH-TRIAZOLAM UR-MCNC: NEGATIVE NG/ML
AMPHETAMINES UR QL: NEGATIVE NG/ML
BARBITURATES UR QL: NEGATIVE NG/ML
BZE UR QL: NEGATIVE NG/ML
CODEINE UR-MCNC: NEGATIVE NG/ML
CREAT UR-MCNC: 77.3 MG/DL
DRUG SCREEN COMMENT UR-IMP: NORMAL
EDDP UR-MCNC: NEGATIVE NG/ML
FENTANYL UR-MCNC: NEGATIVE NG/ML
HYDROCODONE UR-MCNC: NEGATIVE NG/ML
HYDROMORPHONE UR-MCNC: NEGATIVE NG/ML
LORAZEPAM UR-MCNC: NEGATIVE NG/ML
METHADONE UR-MCNC: NEGATIVE NG/ML
MORPHINE UR-MCNC: NEGATIVE NG/ML
NORDIAZEPAM UR-MCNC: NEGATIVE NG/ML
NORFENTANYL UR-MCNC: NEGATIVE NG/ML
NORHYDROCODONE UR CFM-MCNC: NEGATIVE NG/ML
NOROXYCODONE UR CFM-MCNC: NEGATIVE NG/ML
NORTRAMADOL UR-MCNC: NEGATIVE NG/ML
OH-ETHYLFLURAZ UR-MCNC: NEGATIVE NG/ML
OXAZEPAM UR-MCNC: NEGATIVE NG/ML
OXIDANTS UR QL: NEGATIVE MCG/ML
OXYCODONE UR CFM-MCNC: NEGATIVE NG/ML
OXYMORPHONE UR CFM-MCNC: NEGATIVE NG/ML
PCP UR QL: NEGATIVE NG/ML
PH UR: 5.9 [PH] (ref 4.5–9)
QUEST 6 ACETYLMORPHINE: NEGATIVE NG/ML
QUEST NOTES AND COMMENTS: NORMAL
QUEST ZOLPIDEM: NEGATIVE NG/ML
TEMAZEPAM UR-MCNC: NEGATIVE NG/ML
THC UR QL: NEGATIVE NG/ML
TRAMADOL UR-MCNC: NEGATIVE NG/ML
ZOLPIDEM PHENYL-4-CARB UR CFM-MCNC: NEGATIVE NG/ML

## 2025-10-06 ENCOUNTER — APPOINTMENT (OUTPATIENT)
Facility: CLINIC | Age: 40
End: 2025-10-06
Payer: COMMERCIAL